# Patient Record
Sex: FEMALE | Race: WHITE | NOT HISPANIC OR LATINO | Employment: OTHER | URBAN - NONMETROPOLITAN AREA
[De-identification: names, ages, dates, MRNs, and addresses within clinical notes are randomized per-mention and may not be internally consistent; named-entity substitution may affect disease eponyms.]

---

## 2017-03-16 DIAGNOSIS — D05.10: Primary | ICD-10-CM

## 2017-03-17 ENCOUNTER — LAB (OUTPATIENT)
Dept: ONCOLOGY | Facility: HOSPITAL | Age: 57
End: 2017-03-17

## 2017-03-17 ENCOUNTER — OFFICE VISIT (OUTPATIENT)
Dept: ONCOLOGY | Facility: CLINIC | Age: 57
End: 2017-03-17

## 2017-03-17 VITALS
SYSTOLIC BLOOD PRESSURE: 165 MMHG | WEIGHT: 165.2 LBS | BODY MASS INDEX: 29.26 KG/M2 | TEMPERATURE: 97.4 F | DIASTOLIC BLOOD PRESSURE: 98 MMHG | RESPIRATION RATE: 18 BRPM | HEART RATE: 80 BPM

## 2017-03-17 DIAGNOSIS — D05.11: Primary | ICD-10-CM

## 2017-03-17 DIAGNOSIS — D05.10: ICD-10-CM

## 2017-03-17 LAB
BASOPHILS # BLD AUTO: 0.05 10*3/MM3 (ref 0–0.2)
BASOPHILS NFR BLD AUTO: 0.5 % (ref 0–2)
DEPRECATED RDW RBC AUTO: 46.7 FL (ref 36.4–46.3)
EOSINOPHIL # BLD AUTO: 0.16 10*3/MM3 (ref 0–0.7)
EOSINOPHIL NFR BLD AUTO: 1.7 % (ref 0–7)
ERYTHROCYTE [DISTWIDTH] IN BLOOD BY AUTOMATED COUNT: 14.6 % (ref 11.5–14.5)
HCT VFR BLD AUTO: 39.1 % (ref 35–45)
HGB BLD-MCNC: 13.4 G/DL (ref 12–15.5)
IMM GRANULOCYTES # BLD: 0.01 10*3/MM3 (ref 0–0.02)
IMM GRANULOCYTES NFR BLD: 0.1 % (ref 0–0.5)
LYMPHOCYTES # BLD AUTO: 3.39 10*3/MM3 (ref 0.6–4.2)
LYMPHOCYTES NFR BLD AUTO: 35.8 % (ref 10–50)
MCH RBC QN AUTO: 30 PG (ref 26.5–34)
MCHC RBC AUTO-ENTMCNC: 34.3 G/DL (ref 31.4–36)
MCV RBC AUTO: 87.5 FL (ref 80–98)
MONOCYTES # BLD AUTO: 1.01 10*3/MM3 (ref 0–0.9)
MONOCYTES NFR BLD AUTO: 10.7 % (ref 0–12)
NEUTROPHILS # BLD AUTO: 4.85 10*3/MM3 (ref 2–8.6)
NEUTROPHILS NFR BLD AUTO: 51.2 % (ref 37–80)
PLATELET # BLD AUTO: 380 10*3/MM3 (ref 150–450)
PMV BLD AUTO: 9.4 FL (ref 8–12)
RBC # BLD AUTO: 4.47 10*6/MM3 (ref 3.77–5.16)
WBC NRBC COR # BLD: 9.47 10*3/MM3 (ref 3.2–9.8)

## 2017-03-17 PROCEDURE — 85025 COMPLETE CBC W/AUTO DIFF WBC: CPT | Performed by: INTERNAL MEDICINE

## 2017-03-17 PROCEDURE — 36415 COLL VENOUS BLD VENIPUNCTURE: CPT | Performed by: INTERNAL MEDICINE

## 2017-03-17 PROCEDURE — 99213 OFFICE O/P EST LOW 20 MIN: CPT | Performed by: INTERNAL MEDICINE

## 2017-03-17 NOTE — PROGRESS NOTES
DATE OF VISIT: 3/17/2017    REASON FOR VISIT:  History of right breast DCIS    HISTORY OF PRESENT ILLNESS:    57-year-old female with a past medical history significant for chronic obstructive pulmonary disease, hypertension was diagnosed with a ductal carcinoma of right breast in October 2013.  Patient is status post lumpectomy and radiation therapy subsequently was started on tamoxifen March 2014.  Tamoxifen she could not tolerate more than a year so currently she is on surveillance.  Denies any new swollen glands anywhere in the body area.denies any fever or chills or night sweats.        PAST MEDICAL HISTORY:    Past Medical History   Diagnosis Date   • Allergic rhinitis    • Chronic obstructive lung disease      Relatively mild at this point   • Essential (primary) hypertension      Started Diovan, 3/2016      • GERD (gastroesophageal reflux disease)    • History of colon polyps    • Hyperlipidemia      On Pravachol    • Intraductal carcinoma of breast       In situ. Right. Lumpectomy 2013, Dr. Lion. Radiation, Dr. Velasco. On tamoxifen.   • Mild intermittent asthma    • Osteopenia      Last DEXA 7/2014. Started Caltrate, 7/2015    • Tobacco dependence syndrome        SOCIAL HISTORY:    Social History   Substance Use Topics   • Smoking status: Former Smoker   • Smokeless tobacco: None      Comment: Tobacco reviewed with patient 3/15/2016   • Alcohol use No       Surgical History :  Past Surgical History   Procedure Laterality Date   • Other surgical history       Biopsy of Cervix  -  abnormal Pap smear cervical biopsy was normal   • Breast surgery  10/08/2013     (2)  Excision of the previously excised medial margin.   9/16/2013 - Automated vacuum-assisted biopsy of the right breast. Sterotactic location guidance. Radiological examination of surgical specimen. Tissue marker placement   • Other surgical history  09/23/2013     Remove breast lesion (1). Ultrasound localization of the lesion followed by excision, clip  placement and x-ray examination of the surgical specimen   • Vaginal delivery       x 2       ALLERGIES:    Allergies   Allergen Reactions   • Augmentin [Amoxicillin-Pot Clavulanate]      N/V   • Ceftin [Cefuroxime]    • Ciprofloxacin        REVIEW OF SYSTEMS:      CONSTITUTIONAL:  No fever, chills, or night sweats.     HEENT:  No epistaxis, mouth sores, or difficulty swallowing.    RESPIRATORY:  No new shortness of breath or cough at present.    CARDIOVASCULAR:  No chest pain or palpitations.    GASTROINTESTINAL:  No abdominal pain, nausea, vomiting, or blood in the stool.    GENITOURINARY:  No dysuria or hematuria.    MUSCULOSKELETAL:  No any new back pain or arthralgias.     NEUROLOGICAL:  No tingling or numbness. No new headache or dizziness.     LYMPHATICS:  Denies any abnormal swollen and anywhere in the body.    SKIN:  Denies any new skin rash.    PHYSICAL EXAMINATION:      VITAL SIGNS:    Visit Vitals   • /98   • Pulse 80   • Temp 97.4 °F (36.3 °C)   • Resp 18   • Wt 165 lb 3.2 oz (74.9 kg)   • BMI 29.26 kg/m2       GENERAL:  Not in any distress.    HEENT:  Normocephalic, Atraumatic.Mild Conjunctival pallor. No icterus. Extraocular Movements Intact. No Fascial Asymmetry noted.    NECK:  No adenopathy. No JVD.    RESPIRATORY:  Fair air entry bilateral. No rhonchi or wheezing.    CARDIOVASCULAR:  S1, S2. Regular rate and rhythm. No murmur or gallop appreciated.    ABDOMEN:  Soft, obese, nontender. Bowel sounds present in all four quadrants.  No organomegaly appreciated.    EXTREMITIES:  No edema.No Calf Tenderness.    NEUROLOGIC:  Alert, awake and oriented ×3.  No  Motor or sensory deficit appreciated. Cranial Nerves 2-12 grossly intact.      DIAGNOSTIC DATA:    GLUCOSE   Date Value Ref Range Status   07/12/2016 97 70.0 - 100.0 mg/dl Final     SODIUM   Date Value Ref Range Status   07/12/2016 138.0 134 - 146 mmol/L Final     POTASSIUM   Date Value Ref Range Status   07/12/2016 4.4 3.4 - 5.4 mmol/L Final      CO2   Date Value Ref Range Status   07/12/2016 29.0 20.0 - 32.0 mmol/L Final     CHLORIDE   Date Value Ref Range Status   07/12/2016 102.0 100.0 - 112.0 mmol/L Final     ANION GAP   Date Value Ref Range Status   07/12/2016 7.0 5.0 - 15.0 mmol/L Final     CREATININE   Date Value Ref Range Status   07/12/2016 0.7 0.4 - 1.3 mg/dl Final     BUN   Date Value Ref Range Status   07/12/2016 8 8.0 - 25.0 mg/dl Final     CALCIUM   Date Value Ref Range Status   07/12/2016 9.4 8.4 - 10.8 mg/dl Final     ALKALINE PHOSPHATASE   Date Value Ref Range Status   07/12/2016 68 15 - 121 U/L Final     TOTAL PROTEIN   Date Value Ref Range Status   07/12/2016 7.6 6.7 - 8.2 gm/dl Final     ALT (SGPT)   Date Value Ref Range Status   07/12/2016 24 10 - 60 U/L Final     AST (SGOT)   Date Value Ref Range Status   07/12/2016 30 10 - 60 U/L Final     TOTAL BILIRUBIN   Date Value Ref Range Status   07/12/2016 0.4 0.2 - 1.0 mg/dl Final     ALBUMIN   Date Value Ref Range Status   07/12/2016 4.1 3.2 - 5.5 gm/dl Final     Lab Results   Component Value Date    WBC 9.47 03/17/2017    HGB 13.4 03/17/2017    HCT 39.1 03/17/2017    MCV 87.5 03/17/2017     03/17/2017     Lab Results   Component Value Date    NEUTROABS 4.85 03/17/2017          RADIOLOGY DATA :  Diagnostic mammogram of right breast done on September 22, 2016 shows:  CONCLUSION-   1. Probable benign dystrophic calcifications in the postoperative site  in the right breast. Recommend diagnostic right breast mammogram in  six months to ensure stability.      2. BI-RADS Category 3- Probably benign findings. Initial short  interval follow-up suggested.      ASSESSMENT AND PLAN:      1.  Right breast ductal carcinoma in situ diagnosed in October 2013.  Status post lumpectomy followed by adjuvant radiation therapy which was completed in January 2014.  She was started on tamoxifen in March 2014 which he took for a year and half, around October 2015 she started having severe arthralgias and  lower extremities and tamoxifen was discontinued.  She has been on surveillance since then.  Her last diagnostic mammogram of right breast done in September 2016 was BI-RADS Category 3 and she is due to get another screening mammogram of right breast at this point of diagnostic quality.  We will order it.  We will also order a screening mammogram for bilateral breast cyst in August 2017 and see her back here in September 2017.    2.  Hypertension    3.  Chronic obstructive pulmonary disease    4.  Health maintenance: Patient does not smoke currently.  Had a colonoscopy last 5 years.  She remains full code.         Bonilla Granados MD  3/17/2017  4:08 PM        EMR Dragon/Transcription disclaimer:   Much of this encounter note is an electronic transcription/translation of spoken language to printed text. The electronic translation of spoken language may permit erroneous, or at times, nonsensical words or phrases to be inadvertently transcribed; Although I have reviewed the note for such errors, some may still exist.

## 2017-03-27 ENCOUNTER — OFFICE VISIT (OUTPATIENT)
Dept: FAMILY MEDICINE CLINIC | Facility: CLINIC | Age: 57
End: 2017-03-27

## 2017-03-27 VITALS
TEMPERATURE: 98.7 F | SYSTOLIC BLOOD PRESSURE: 124 MMHG | HEART RATE: 76 BPM | WEIGHT: 163.4 LBS | BODY MASS INDEX: 28.95 KG/M2 | DIASTOLIC BLOOD PRESSURE: 68 MMHG | HEIGHT: 63 IN

## 2017-03-27 DIAGNOSIS — R00.2 PALPITATIONS: Primary | ICD-10-CM

## 2017-03-27 DIAGNOSIS — J43.9 PULMONARY EMPHYSEMA, UNSPECIFIED EMPHYSEMA TYPE (HCC): ICD-10-CM

## 2017-03-27 PROCEDURE — 99213 OFFICE O/P EST LOW 20 MIN: CPT | Performed by: INTERNAL MEDICINE

## 2017-03-27 PROCEDURE — 93000 ELECTROCARDIOGRAM COMPLETE: CPT | Performed by: INTERNAL MEDICINE

## 2017-04-06 ENCOUNTER — TELEPHONE (OUTPATIENT)
Dept: FAMILY MEDICINE CLINIC | Facility: CLINIC | Age: 57
End: 2017-04-06

## 2017-04-06 RX ORDER — METOPROLOL SUCCINATE 25 MG/1
25 TABLET, EXTENDED RELEASE ORAL DAILY
Qty: 30 TABLET | Refills: 5 | Status: SHIPPED | OUTPATIENT
Start: 2017-04-06 | End: 2017-07-20

## 2017-04-06 NOTE — TELEPHONE ENCOUNTER
----- Message from Jesus López MD sent at 4/6/2017 12:02 PM CDT -----  Notify the patient.  Holter monitor reveals occasional episodes of rapid, but harmless heart rate.  Start Toprol-XL 25 mg daily.  Return for further evaluation if this does not greatly help her sensation of palpitations.  EB      Relayed results to patient and meds called to pharmacy. BERNARD

## 2017-04-14 ENCOUNTER — TELEPHONE (OUTPATIENT)
Dept: FAMILY MEDICINE CLINIC | Facility: CLINIC | Age: 57
End: 2017-04-14

## 2017-04-14 NOTE — TELEPHONE ENCOUNTER
"----- Message from Jesus López MD sent at 4/14/2017 10:29 AM CDT -----  Have the patient take Toprol-XL 25 mg one half tablet twice daily over the weekend and notify us next week if this does not help with her symptoms.  If still having problems, we need to have her come back in for further evaluation.  ----- Message -----     From: Jayne Del Rio RN     Sent: 4/14/2017   9:24 AM       To: Jesus López MD    I called patient and she states around 9 pm for the last 2 nights since taking Toprol has had increase in palpitations along with SOB, headache and chest tightness. States last approximately 1 hour. TP  ----- Message -----     From: Kimberly Viveros     Sent: 4/14/2017   8:27 AM       To: Jayne Del Rio RN    Patient states, \"Having side effects from new medication\". Please call to advise      I called patient back and instructed regarding new directions. She states she is afraid of the meds and doesn't want to try it. I instructed if symptoms returned to f/u with a physician or ER.. TP  "

## 2017-04-14 NOTE — TELEPHONE ENCOUNTER
"----- Message from Kimberly Viveros sent at 4/14/2017  8:27 AM CDT -----  Patient states, \"Having side effects from new medication\". Please call to advise  "

## 2017-07-20 ENCOUNTER — OFFICE VISIT (OUTPATIENT)
Dept: OBSTETRICS AND GYNECOLOGY | Facility: CLINIC | Age: 57
End: 2017-07-20

## 2017-07-20 VITALS
DIASTOLIC BLOOD PRESSURE: 86 MMHG | WEIGHT: 162 LBS | BODY MASS INDEX: 28.7 KG/M2 | HEIGHT: 63 IN | SYSTOLIC BLOOD PRESSURE: 135 MMHG

## 2017-07-20 DIAGNOSIS — N81.11 MIDLINE CYSTOCELE: Primary | ICD-10-CM

## 2017-07-20 PROCEDURE — 99212 OFFICE O/P EST SF 10 MIN: CPT | Performed by: OBSTETRICS & GYNECOLOGY

## 2017-07-21 NOTE — PROGRESS NOTES
Subjective     Chief Complaint   Patient presents with   • Gynecologic Exam       Val Waldron is a 57 y.o.   presents today with complaints of pelvic pressure.  Patient states it started about 1 year ago, however, it started worsening about October, however, surprisingly it got better this summer since school got out.  She states when she stands on her feet for long periods of time, she feels a pressure in her vagina and a heaviness.  No bulge.  No discharge or bleeding.  No issues with urination or bowel movements, but she states sometimes she has to move around on the toilet to empty her bladder completely.  No crede maneuver.  No splinting for bowel movements    Patient states she has had 2 vaginal deliveries, no operative deliveries; had an episiotomy in first pregnancy and a large tear in 2nd pregnancy but states she doesn't think it was a 3rd or 4th degree.   Did have the diagnosis of COPD, but states since she stopped smoking no more chronic cough.     Past Medical History:   Diagnosis Date   • Allergic rhinitis    • Chronic obstructive lung disease     Relatively mild at this point   • Essential (primary) hypertension     Started Diovan, 3/2016      • GERD (gastroesophageal reflux disease)    • History of colon polyps    • Hyperlipidemia     On Pravachol    • Intraductal carcinoma of breast      In situ. Right. Lumpectomy , Dr. Lion. Radiation, Dr. Velasco. On tamoxifen.   • Mild intermittent asthma    • Osteopenia     Last DEXA 2014. Started Caltrate, 2015    • Tobacco dependence syndrome      Past Surgical History:   Procedure Laterality Date   • BREAST SURGERY  10/08/2013    (2)  Excision of the previously excised medial margin.   2013 - Automated vacuum-assisted biopsy of the right breast. Sterotactic location guidance. Radiological examination of surgical specimen. Tissue marker placement   • OTHER SURGICAL HISTORY      Biopsy of Cervix  -  abnormal Pap smear cervical biopsy was  "normal   • OTHER SURGICAL HISTORY  09/23/2013    Remove breast lesion (1). Ultrasound localization of the lesion followed by excision, clip placement and x-ray examination of the surgical specimen   • VAGINAL DELIVERY      x 2     Social History     Social History   • Marital status:      Spouse name: N/A   • Number of children: N/A   • Years of education: N/A     Occupational History   • Not on file.     Social History Main Topics   • Smoking status: Former Smoker   • Smokeless tobacco: Not on file      Comment: Tobacco reviewed with patient 3/15/2016   • Alcohol use No   • Drug use: No   • Sexual activity: Not on file     Other Topics Concern   • Not on file     Social History Narrative     Family History   Problem Relation Age of Onset   • Coronary artery disease Mother    • Prostate cancer Father    • Breast cancer Other      Aunt   • Endometrial cancer Neg Hx    • Ovarian cancer Neg Hx    • Colon cancer Neg Hx      Colorectal cancer     Objective      /86  Ht 63\" (160 cm)  Wt 162 lb (73.5 kg)  BMI 28.7 kg/m2    Physical Exam  General:   Appears stated age, AAOx3, NAD   Abdomen: Soft, nttp, no masses palpated   Pelvis: External genitalia - NEFG  Urethra - normal appearing, no urethra caruncle or prolapse  Vagina - normal appearing vagina, no discharge or bleeding noted, no lesions.  Stage 2-3 cystocele   Cervix - Normal appearing cervix  Uterus - 6 week sized uterus with stage 1 prolapse   Adenxa - no adnexal fullness or masses noted  Anus - normal appearing   Neurologic: CN II - XII grossly intact       Assessment/Plan     ASSESSMENT  1. Midline cystocele        PLAN  1. Midline cystocele   - Discussed prolapse and quality of life issues  - Discussed options for treatment including expectant vs medical with pessary vs surgery with anterior repair  - Patient is undecided, pamphlets given on prolapse and treatment  - Patient will contact office once she decides what she wants to do.     Lissy GARCIA" Friday, MD  7/21/2017

## 2020-10-01 ENCOUNTER — OFFICE VISIT (OUTPATIENT)
Dept: SURGERY | Facility: CLINIC | Age: 60
End: 2020-10-01

## 2020-10-01 ENCOUNTER — HOSPITAL ENCOUNTER (OUTPATIENT)
Dept: ULTRASOUND IMAGING | Facility: HOSPITAL | Age: 60
Discharge: HOME | End: 2020-10-01
Admitting: SURGERY

## 2020-10-01 VITALS
DIASTOLIC BLOOD PRESSURE: 86 MMHG | HEART RATE: 91 BPM | BODY MASS INDEX: 30.49 KG/M2 | SYSTOLIC BLOOD PRESSURE: 130 MMHG | TEMPERATURE: 97.1 F | HEIGHT: 64 IN | WEIGHT: 178.6 LBS

## 2020-10-01 DIAGNOSIS — R92.8 ABNORMAL MAMMOGRAM: ICD-10-CM

## 2020-10-01 DIAGNOSIS — R92.8 ABNORMAL MAMMOGRAM: Primary | ICD-10-CM

## 2020-10-01 DIAGNOSIS — D05.11 DUCTAL CARCINOMA IN SITU (DCIS) OF RIGHT BREAST: ICD-10-CM

## 2020-10-01 PROCEDURE — 76642 ULTRASOUND BREAST LIMITED: CPT

## 2020-10-01 PROCEDURE — 99203 OFFICE O/P NEW LOW 30 MIN: CPT | Performed by: SURGERY

## 2020-10-01 RX ORDER — OXYCODONE HYDROCHLORIDE AND ACETAMINOPHEN 5; 325 MG/1; MG/1
1 TABLET ORAL ONCE
Status: CANCELLED | OUTPATIENT
Start: 2020-10-01 | End: 2020-10-01

## 2020-10-01 RX ORDER — ROSUVASTATIN CALCIUM 10 MG/1
10 TABLET, COATED ORAL DAILY
COMMUNITY
Start: 2020-09-22 | End: 2021-04-07 | Stop reason: ALTCHOICE

## 2020-10-01 RX ORDER — LIDOCAINE HYDROCHLORIDE AND EPINEPHRINE 10; 10 MG/ML; UG/ML
30 INJECTION, SOLUTION INFILTRATION; PERINEURAL ONCE
Status: CANCELLED | OUTPATIENT
Start: 2020-10-01 | End: 2020-10-01

## 2020-10-01 RX ORDER — FLUTICASONE FUROATE 100 UG/1
1 POWDER RESPIRATORY (INHALATION) DAILY
COMMUNITY
Start: 2020-09-22 | End: 2021-11-16 | Stop reason: SDUPTHER

## 2020-10-01 RX ORDER — SIMETHICONE 125 MG
125 TABLET,CHEWABLE ORAL AS NEEDED
COMMUNITY

## 2020-10-01 RX ORDER — DIAZEPAM 5 MG/1
5 TABLET ORAL ONCE
Status: CANCELLED | OUTPATIENT
Start: 2020-10-01 | End: 2020-10-01

## 2020-10-01 RX ORDER — MONTELUKAST SODIUM 10 MG/1
10 TABLET ORAL DAILY
COMMUNITY
Start: 2020-09-22 | End: 2023-01-09 | Stop reason: SDUPTHER

## 2020-10-01 NOTE — H&P (VIEW-ONLY)
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  She presents today with an abnormal left mammogram and ultrasound suggesting a complex cystic lesion at the 3 o'clock position 4 cm from the nipple.  This is been read as BI-RADS Category 4.  She has had no newly palpable masses, skin dimpling, nipple discharge, or axillary adenopathy.  Imaging demonstrates the following:       Suspicious abnormality -- Biopsy should be considered (BI-RADS 4).    Recommendation:  Ultrasound-guided left breast biopsy. That can be performed at our facilities in [place] on an outpatient basis, if desired.    [id number]   Result Narrative   ULTRASOUND  LEFT  BREAST LIMITED    Indication:  Abnormal mammogram. Callback from screening.    Comparison:  Recent screening mammography 9/22/2020.    Findings:  There is small circumscribed mass in the slight lateral and inferior left breast anterior third seen on mammography that was further evaluated with ultrasound. At 3:00 about 1 cm from nipple is the correlate. This measures 5 x 3 mm with long   axis parallel to the skin. It is a complex cystic and solid mass with no definite internal vascularity on Doppler. At least half of the lesion appears to be solid. It does have circumscribed borders and no posterior shadowing. The patient has a history   of prior malignancy in the right breast in 2013. Recommend ultrasound-guided biopsy of this lesion since it is not definitively benign.    I spoke to the patient about findings and gave her my recommendations. She is comfortable with that plan. She is following up with her referring for ordering and scheduling.     Unfortunately, I was unable to open these x-rays and an ultrasound obtained today here demonstrates the lesion in question.  I would read  this is BI-RADS Category 4.  Past Medical History:   Diagnosis Date   • Allergic rhinitis    • Breast cancer (CMS/HCC)    • Chronic obstructive lung disease (CMS/HCC)     Relatively mild at this point   • Essential (primary) hypertension     Started Diovan, 3/2016      • GERD (gastroesophageal reflux disease)    • History of colon polyps    • Hx of radiation therapy    • Hyperlipidemia     On Pravachol    • Intraductal carcinoma of breast      In situ. Right. Lumpectomy 2013, Dr. Lion. Radiation, Dr. Velasco. On tamoxifen.   • Mild intermittent asthma    • Osteopenia     Last DEXA 7/2014. Started Caltrate, 7/2015    • Tobacco dependence syndrome        Past Surgical History:   Procedure Laterality Date   • BREAST BIOPSY     • BREAST LUMPECTOMY     • BREAST SURGERY  10/08/2013    (2)  Excision of the previously excised medial margin.   9/16/2013 - Automated vacuum-assisted biopsy of the right breast. Sterotactic location guidance. Radiological examination of surgical specimen. Tissue marker placement   • OTHER SURGICAL HISTORY      Biopsy of Cervix  -  abnormal Pap smear cervical biopsy was normal   • OTHER SURGICAL HISTORY  09/23/2013    Remove breast lesion (1). Ultrasound localization of the lesion followed by excision, clip placement and x-ray examination of the surgical specimen   • VAGINAL DELIVERY      x 2         Current Outpatient Medications:   •  fexofenadine (ALLEGRA) 180 MG tablet, Take 180 mg by mouth as needed., Disp: , Rfl:   •  Fluticasone Furoate (Arnuity Ellipta) 100 MCG/ACT aerosol powder , Inhale 1 puff Daily., Disp: , Rfl:   •  lansoprazole (PREVACID) 30 MG capsule, Take 30 mg by mouth every morning. (unconfirmed), Disp: , Rfl:   •  montelukast (SINGULAIR) 10 MG tablet, Take 10 mg by mouth Daily., Disp: , Rfl:   •  pravastatin (PRAVACHOL) 20 MG tablet, Take  by mouth. Take one tablet every night at bedtime for cholesterol.  (unconfirmed), Disp: , Rfl:   •  VITAMIN D PO, Take 50,000 Units by mouth.,  Disp: , Rfl:   •  albuterol (PROAIR HFA) 108 (90 BASE) MCG/ACT inhaler, Inhale 2 puffs 4 (four) times a day as needed for wheezing. (unconfirmed), Disp: , Rfl:   •  pseudoephedrine-guaifenesin (MUCINEX D)  MG per 12 hr tablet, Take  by mouth. Take one tablet every morning and take one tablet with supper as needed for congestion/allergies.  (unconfirmed), Disp: , Rfl:   •  rosuvastatin (CRESTOR) 10 MG tablet, Take 10 mg by mouth Daily., Disp: , Rfl:   •  simethicone (MYLICON) 125 MG chewable tablet, Chew 125 mg As Needed., Disp: , Rfl:     Allergies   Allergen Reactions   • Augmentin [Amoxicillin-Pot Clavulanate]      N/V   • Ceftin [Cefuroxime]    • Ciprofloxacin        Family History   Problem Relation Age of Onset   • Coronary artery disease Mother    • Prostate cancer Father    • Breast cancer Other         Aunt   • Breast cancer Paternal Aunt    • Endometrial cancer Neg Hx    • Ovarian cancer Neg Hx    • Colon cancer Neg Hx         Colorectal cancer       Social History     Socioeconomic History   • Marital status:      Spouse name: Not on file   • Number of children: Not on file   • Years of education: Not on file   • Highest education level: Not on file   Tobacco Use   • Smoking status: Former Smoker     Types: Cigarettes   • Smokeless tobacco: Never Used   • Tobacco comment: Tobacco reviewed with patient 3/15/2016   Substance and Sexual Activity   • Alcohol use: No   • Drug use: No       Review of Systems   Constitutional: Negative.    HENT: Negative.    Eyes: Negative.    Respiratory: Negative.    Cardiovascular: Negative.    Gastrointestinal:        Hemorrhoids   Endocrine: Negative.    Genitourinary: Negative.    Musculoskeletal: Negative.    Skin: Positive for rash.   Allergic/Immunologic: Positive for environmental allergies.   Neurological: Negative.    Psychiatric/Behavioral: Negative.        Physical Exam  Vitals signs and nursing note reviewed. Exam conducted with a chaperone present.      Constitutional:       Appearance: Normal appearance.   HENT:      Head: Normocephalic and atraumatic.   Neck:      Musculoskeletal: Normal range of motion and neck supple.   Cardiovascular:      Rate and Rhythm: Normal rate and regular rhythm.   Pulmonary:      Effort: Pulmonary effort is normal. No respiratory distress.   Chest:      Breasts: Breasts are symmetrical.         Right: No inverted nipple, mass, nipple discharge, skin change or tenderness.         Left: No inverted nipple, mass, nipple discharge, skin change or tenderness.   Musculoskeletal: Normal range of motion.         General: No swelling.   Skin:     General: Skin is warm and dry.      Coloration: Skin is not jaundiced or pale.      Findings: No bruising, erythema or rash.   Neurological:      General: No focal deficit present.      Mental Status: She is alert and oriented to person, place, and time.   Psychiatric:         Mood and Affect: Mood normal.         Thought Content: Thought content normal.         Judgment: Judgment normal.           ASSESSMENT    Val was seen today for abnormal breast imaging.    Diagnoses and all orders for this visit:    Abnormal mammogram  -     US Breast Left Limited; Future  -     US Guided Breast Biopsy With & Without Device initial Left    History of ductal carcinoma in situ (DCIS) of right breast treated in 2013 with lumpectomy and radiation therapy    PLAN    1.  Ultrasound directed left mammotome biopsy clip placement are planned    The following were discussed with the patient/family:      What are the indications that have led your doctor to the opinion that an operation is necessary?    Breast imaging has determined an abnormal area requiring biopsy.    What, if any, alternative treatments are available for your condition?    Alternatively, open biopsy in the operating room may be performed under sedation or general anesthesia. Observation is not a good option.    What will be the likely result if you  don't have the operation?    There is a possibility of missing a breast cancer diagnosis.    What are the basic procedures involved in the operation?    The patient will be placed supine for the procedure. A small incision will be made under local anesthesia, and a special, large needle will be used to perform the biopsy.   A permanent titanium clip will be placed in the breast to harjinder the site of biopsy should further surgery be necessary.    What are the risks?    The risks of bleeding, infection, the possible need for open biopsy or other procedure, scarring, and poor wound healing are explained. Bruising almost always occurs. There is a low transfusion risk. The risks of chronic pain are, likewise, low.     How is the operation expected to improve your health or quality of life?    The lesion can usually be determined to be benign or malignant. Follow up xrays or further open excision may be necessary depending on the pathology.    Is hospitalization necessary and, if so, how long can you expect to be hospitalized?    This procedure is performed on an outpatient basis.    What can you expect during your recovery period?    Minimal pain is usually controlled with non-narcotic analgesia.    When can you expect to resume normal activities?    Normal activity may be resumed the following day.    Are there likely to be residual effects from the operation?    Usually, there are no residual effects following biiopsy.    .   All questions were answered. The patient agrees to operation.              This document has been electronically signed by David Lion MD on October 1, 2020 19:25 CDT

## 2020-10-01 NOTE — PROGRESS NOTES
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  She presents today with an abnormal left mammogram and ultrasound suggesting a complex cystic lesion at the 3 o'clock position 4 cm from the nipple.  This is been read as BI-RADS Category 4.  She has had no newly palpable masses, skin dimpling, nipple discharge, or axillary adenopathy.  Imaging demonstrates the following:       Suspicious abnormality -- Biopsy should be considered (BI-RADS 4).    Recommendation:  Ultrasound-guided left breast biopsy. That can be performed at our facilities in [place] on an outpatient basis, if desired.    [id number]   Result Narrative   ULTRASOUND  LEFT  BREAST LIMITED    Indication:  Abnormal mammogram. Callback from screening.    Comparison:  Recent screening mammography 9/22/2020.    Findings:  There is small circumscribed mass in the slight lateral and inferior left breast anterior third seen on mammography that was further evaluated with ultrasound. At 3:00 about 1 cm from nipple is the correlate. This measures 5 x 3 mm with long   axis parallel to the skin. It is a complex cystic and solid mass with no definite internal vascularity on Doppler. At least half of the lesion appears to be solid. It does have circumscribed borders and no posterior shadowing. The patient has a history   of prior malignancy in the right breast in 2013. Recommend ultrasound-guided biopsy of this lesion since it is not definitively benign.    I spoke to the patient about findings and gave her my recommendations. She is comfortable with that plan. She is following up with her referring for ordering and scheduling.     Unfortunately, I was unable to open these x-rays and an ultrasound obtained today here demonstrates the lesion in question.  I would read  this is BI-RADS Category 4.  Past Medical History:   Diagnosis Date   • Allergic rhinitis    • Breast cancer (CMS/HCC)    • Chronic obstructive lung disease (CMS/HCC)     Relatively mild at this point   • Essential (primary) hypertension     Started Diovan, 3/2016      • GERD (gastroesophageal reflux disease)    • History of colon polyps    • Hx of radiation therapy    • Hyperlipidemia     On Pravachol    • Intraductal carcinoma of breast      In situ. Right. Lumpectomy 2013, Dr. Lion. Radiation, Dr. Velasco. On tamoxifen.   • Mild intermittent asthma    • Osteopenia     Last DEXA 7/2014. Started Caltrate, 7/2015    • Tobacco dependence syndrome        Past Surgical History:   Procedure Laterality Date   • BREAST BIOPSY     • BREAST LUMPECTOMY     • BREAST SURGERY  10/08/2013    (2)  Excision of the previously excised medial margin.   9/16/2013 - Automated vacuum-assisted biopsy of the right breast. Sterotactic location guidance. Radiological examination of surgical specimen. Tissue marker placement   • OTHER SURGICAL HISTORY      Biopsy of Cervix  -  abnormal Pap smear cervical biopsy was normal   • OTHER SURGICAL HISTORY  09/23/2013    Remove breast lesion (1). Ultrasound localization of the lesion followed by excision, clip placement and x-ray examination of the surgical specimen   • VAGINAL DELIVERY      x 2         Current Outpatient Medications:   •  fexofenadine (ALLEGRA) 180 MG tablet, Take 180 mg by mouth as needed., Disp: , Rfl:   •  Fluticasone Furoate (Arnuity Ellipta) 100 MCG/ACT aerosol powder , Inhale 1 puff Daily., Disp: , Rfl:   •  lansoprazole (PREVACID) 30 MG capsule, Take 30 mg by mouth every morning. (unconfirmed), Disp: , Rfl:   •  montelukast (SINGULAIR) 10 MG tablet, Take 10 mg by mouth Daily., Disp: , Rfl:   •  pravastatin (PRAVACHOL) 20 MG tablet, Take  by mouth. Take one tablet every night at bedtime for cholesterol.  (unconfirmed), Disp: , Rfl:   •  VITAMIN D PO, Take 50,000 Units by mouth.,  Disp: , Rfl:   •  albuterol (PROAIR HFA) 108 (90 BASE) MCG/ACT inhaler, Inhale 2 puffs 4 (four) times a day as needed for wheezing. (unconfirmed), Disp: , Rfl:   •  pseudoephedrine-guaifenesin (MUCINEX D)  MG per 12 hr tablet, Take  by mouth. Take one tablet every morning and take one tablet with supper as needed for congestion/allergies.  (unconfirmed), Disp: , Rfl:   •  rosuvastatin (CRESTOR) 10 MG tablet, Take 10 mg by mouth Daily., Disp: , Rfl:   •  simethicone (MYLICON) 125 MG chewable tablet, Chew 125 mg As Needed., Disp: , Rfl:     Allergies   Allergen Reactions   • Augmentin [Amoxicillin-Pot Clavulanate]      N/V   • Ceftin [Cefuroxime]    • Ciprofloxacin        Family History   Problem Relation Age of Onset   • Coronary artery disease Mother    • Prostate cancer Father    • Breast cancer Other         Aunt   • Breast cancer Paternal Aunt    • Endometrial cancer Neg Hx    • Ovarian cancer Neg Hx    • Colon cancer Neg Hx         Colorectal cancer       Social History     Socioeconomic History   • Marital status:      Spouse name: Not on file   • Number of children: Not on file   • Years of education: Not on file   • Highest education level: Not on file   Tobacco Use   • Smoking status: Former Smoker     Types: Cigarettes   • Smokeless tobacco: Never Used   • Tobacco comment: Tobacco reviewed with patient 3/15/2016   Substance and Sexual Activity   • Alcohol use: No   • Drug use: No       Review of Systems   Constitutional: Negative.    HENT: Negative.    Eyes: Negative.    Respiratory: Negative.    Cardiovascular: Negative.    Gastrointestinal:        Hemorrhoids   Endocrine: Negative.    Genitourinary: Negative.    Musculoskeletal: Negative.    Skin: Positive for rash.   Allergic/Immunologic: Positive for environmental allergies.   Neurological: Negative.    Psychiatric/Behavioral: Negative.        Physical Exam  Vitals signs and nursing note reviewed. Exam conducted with a chaperone present.    Constitutional:       Appearance: Normal appearance.   HENT:      Head: Normocephalic and atraumatic.   Neck:      Musculoskeletal: Normal range of motion and neck supple.   Cardiovascular:      Rate and Rhythm: Normal rate and regular rhythm.   Pulmonary:      Effort: Pulmonary effort is normal. No respiratory distress.   Chest:      Breasts: Breasts are symmetrical.         Right: No inverted nipple, mass, nipple discharge, skin change or tenderness.         Left: No inverted nipple, mass, nipple discharge, skin change or tenderness.   Musculoskeletal: Normal range of motion.         General: No swelling.   Skin:     General: Skin is warm and dry.      Coloration: Skin is not jaundiced or pale.      Findings: No bruising, erythema or rash.   Neurological:      General: No focal deficit present.      Mental Status: She is alert and oriented to person, place, and time.   Psychiatric:         Mood and Affect: Mood normal.         Thought Content: Thought content normal.         Judgment: Judgment normal.           ASSESSMENT    Val was seen today for abnormal breast imaging.    Diagnoses and all orders for this visit:    Abnormal mammogram  -     US Breast Left Limited; Future  -     US Guided Breast Biopsy With & Without Device initial Left    History of ductal carcinoma in situ (DCIS) of right breast treated in 2013 with lumpectomy and radiation therapy    PLAN    1.  Ultrasound directed left mammotome biopsy clip placement are planned    The following were discussed with the patient/family:      What are the indications that have led your doctor to the opinion that an operation is necessary?    Breast imaging has determined an abnormal area requiring biopsy.    What, if any, alternative treatments are available for your condition?    Alternatively, open biopsy in the operating room may be performed under sedation or general anesthesia. Observation is not a good option.    What will be the likely result if you  don't have the operation?    There is a possibility of missing a breast cancer diagnosis.    What are the basic procedures involved in the operation?    The patient will be placed supine for the procedure. A small incision will be made under local anesthesia, and a special, large needle will be used to perform the biopsy.   A permanent titanium clip will be placed in the breast to harjinder the site of biopsy should further surgery be necessary.    What are the risks?    The risks of bleeding, infection, the possible need for open biopsy or other procedure, scarring, and poor wound healing are explained. Bruising almost always occurs. There is a low transfusion risk. The risks of chronic pain are, likewise, low.     How is the operation expected to improve your health or quality of life?    The lesion can usually be determined to be benign or malignant. Follow up xrays or further open excision may be necessary depending on the pathology.    Is hospitalization necessary and, if so, how long can you expect to be hospitalized?    This procedure is performed on an outpatient basis.    What can you expect during your recovery period?    Minimal pain is usually controlled with non-narcotic analgesia.    When can you expect to resume normal activities?    Normal activity may be resumed the following day.    Are there likely to be residual effects from the operation?    Usually, there are no residual effects following biiopsy.    .   All questions were answered. The patient agrees to operation.              This document has been electronically signed by David Lion MD on October 1, 2020 19:25 CDT

## 2020-10-01 NOTE — PATIENT INSTRUCTIONS

## 2020-10-02 ENCOUNTER — TRANSCRIBE ORDERS (OUTPATIENT)
Dept: GENERAL RADIOLOGY | Facility: HOSPITAL | Age: 60
End: 2020-10-02

## 2020-10-02 ENCOUNTER — LAB (OUTPATIENT)
Dept: LAB | Facility: HOSPITAL | Age: 60
End: 2020-10-02

## 2020-10-02 DIAGNOSIS — Z01.818 PREOP TESTING: ICD-10-CM

## 2020-10-02 DIAGNOSIS — Z01.818 PREOP TESTING: Primary | ICD-10-CM

## 2020-10-02 PROCEDURE — U0003 INFECTIOUS AGENT DETECTION BY NUCLEIC ACID (DNA OR RNA); SEVERE ACUTE RESPIRATORY SYNDROME CORONAVIRUS 2 (SARS-COV-2) (CORONAVIRUS DISEASE [COVID-19]), AMPLIFIED PROBE TECHNIQUE, MAKING USE OF HIGH THROUGHPUT TECHNOLOGIES AS DESCRIBED BY CMS-2020-01-R: HCPCS

## 2020-10-02 PROCEDURE — C9803 HOPD COVID-19 SPEC COLLECT: HCPCS

## 2020-10-03 LAB
COVID LABCORP PRIORITY: NORMAL
SARS-COV-2 RNA RESP QL NAA+PROBE: NOT DETECTED

## 2020-10-05 ENCOUNTER — HOSPITAL ENCOUNTER (OUTPATIENT)
Dept: ULTRASOUND IMAGING | Facility: HOSPITAL | Age: 60
Discharge: HOME | End: 2020-10-05
Admitting: SURGERY

## 2020-10-05 VITALS
WEIGHT: 175.93 LBS | HEART RATE: 71 BPM | BODY MASS INDEX: 30.04 KG/M2 | TEMPERATURE: 97.1 F | OXYGEN SATURATION: 96 % | RESPIRATION RATE: 18 BRPM | DIASTOLIC BLOOD PRESSURE: 90 MMHG | SYSTOLIC BLOOD PRESSURE: 184 MMHG | HEIGHT: 64 IN

## 2020-10-05 DIAGNOSIS — R92.8 ABNORMAL MAMMOGRAM: ICD-10-CM

## 2020-10-05 PROCEDURE — A4648 IMPLANTABLE TISSUE MARKER: HCPCS

## 2020-10-05 PROCEDURE — 19083 BX BREAST 1ST LESION US IMAG: CPT | Performed by: SURGERY

## 2020-10-05 RX ORDER — LIDOCAINE HYDROCHLORIDE AND EPINEPHRINE 10; 10 MG/ML; UG/ML
30 INJECTION, SOLUTION INFILTRATION; PERINEURAL ONCE
Status: COMPLETED | OUTPATIENT
Start: 2020-10-05 | End: 2020-10-05

## 2020-10-05 RX ORDER — OXYCODONE HYDROCHLORIDE AND ACETAMINOPHEN 5; 325 MG/1; MG/1
1 TABLET ORAL ONCE
Status: COMPLETED | OUTPATIENT
Start: 2020-10-05 | End: 2020-10-05

## 2020-10-05 RX ORDER — DIAZEPAM 5 MG/1
5 TABLET ORAL ONCE
Status: COMPLETED | OUTPATIENT
Start: 2020-10-05 | End: 2020-10-05

## 2020-10-05 RX ADMIN — DIAZEPAM 5 MG: 5 TABLET ORAL at 06:41

## 2020-10-05 RX ADMIN — OXYCODONE HYDROCHLORIDE AND ACETAMINOPHEN 1 TABLET: 5; 325 TABLET ORAL at 06:42

## 2020-10-05 RX ADMIN — LIDOCAINE HYDROCHLORIDE,EPINEPHRINE BITARTRATE 15 ML: 10; .01 INJECTION, SOLUTION INFILTRATION; PERINEURAL at 08:36

## 2020-10-05 NOTE — INTERVAL H&P NOTE
H&P reviewed. The patient was examined and there are no changes to the H&P.      Temp:  [97.2 °F (36.2 °C)] 97.2 °F (36.2 °C)  Heart Rate:  [76] 76  Resp:  [18] 18  BP: (179)/(83) 179/83

## 2020-10-07 LAB
LAB AP CASE REPORT: NORMAL
PATH REPORT.FINAL DX SPEC: NORMAL

## 2020-10-09 ENCOUNTER — TELEPHONE (OUTPATIENT)
Dept: SURGERY | Facility: CLINIC | Age: 60
End: 2020-10-09

## 2020-10-12 DIAGNOSIS — R92.8 ABNORMAL MAMMOGRAM: Primary | ICD-10-CM

## 2021-02-09 ENCOUNTER — OFFICE VISIT (OUTPATIENT)
Dept: FAMILY MEDICINE CLINIC | Facility: CLINIC | Age: 61
End: 2021-02-09

## 2021-02-09 VITALS
TEMPERATURE: 97 F | WEIGHT: 175 LBS | HEART RATE: 84 BPM | SYSTOLIC BLOOD PRESSURE: 160 MMHG | DIASTOLIC BLOOD PRESSURE: 80 MMHG | HEIGHT: 64 IN | BODY MASS INDEX: 29.88 KG/M2

## 2021-02-09 DIAGNOSIS — J01.00 ACUTE NON-RECURRENT MAXILLARY SINUSITIS: Primary | ICD-10-CM

## 2021-02-09 DIAGNOSIS — E53.8 VITAMIN B12 DEFICIENCY: ICD-10-CM

## 2021-02-09 DIAGNOSIS — E78.2 MIXED HYPERLIPIDEMIA: Chronic | ICD-10-CM

## 2021-02-09 DIAGNOSIS — I10 ESSENTIAL (PRIMARY) HYPERTENSION: Chronic | ICD-10-CM

## 2021-02-09 DIAGNOSIS — J30.1 SEASONAL ALLERGIC RHINITIS DUE TO POLLEN: Chronic | ICD-10-CM

## 2021-02-09 DIAGNOSIS — E66.3 OVERWEIGHT (BMI 25.0-29.9): ICD-10-CM

## 2021-02-09 DIAGNOSIS — D05.11 DUCTAL CARCINOMA IN SITU (DCIS) OF RIGHT BREAST: Chronic | ICD-10-CM

## 2021-02-09 DIAGNOSIS — M85.852 OSTEOPENIA OF LEFT HIP: Chronic | ICD-10-CM

## 2021-02-09 DIAGNOSIS — R92.8 ABNORMAL MAMMOGRAM OF LEFT BREAST: ICD-10-CM

## 2021-02-09 PROCEDURE — 99204 OFFICE O/P NEW MOD 45 MIN: CPT | Performed by: INTERNAL MEDICINE

## 2021-02-09 RX ORDER — DOXYCYCLINE HYCLATE 100 MG/1
100 CAPSULE ORAL 2 TIMES DAILY
Qty: 20 CAPSULE | Refills: 0 | Status: SHIPPED | OUTPATIENT
Start: 2021-02-09 | End: 2021-02-19

## 2021-02-09 NOTE — PATIENT INSTRUCTIONS

## 2021-02-09 NOTE — PROGRESS NOTES
Chief Complaint  URI (sinus congestion x 2 weeks, pressure in sinus and facial pain. She states had this 1 year ago), Headache (she has been seeing Anna Hunt but wants to change everything back to [facility]. States she is having breast biopsy and colonoscopy from [facility] providers and wants to continue. ), Nausea (usually occurs with sinus problem), and Establish Care (Reestablish as new patient.  Not seen since 2017.  Been seeing Anna Hunt)    Subjective          History of Present Illness     Val shearer presents to [facility] reporting 2-week history of sinus pressure and congestion worsening significantly last Friday, when she noticed blood-tinged nasal discharge.  She has had occasional cough productive of yellow sputum.  Denies fever or chills.  Patient reports CT of the sinuses a couple of years ago was unremarkable.  Dr. Hartley started her on Singulair and Astelin nasal spray.  However, she has switched to Flonase the past few days and has been using Simply Saline 2-3 times daily. She had allergy testing by Dr. Hartley revealing allergen reactive allergies.  She has been walking outdoors for exercise and feels the cold temperatures has flared the symptoms.  A similar episode one year ago was treated with doxycycline and Flonase with good results. Her breathing is significantly better since she stopped smoking.             Patient is status post lumpectomy right breast and radiation therapy subsequently was started on tamoxifen March 2014.  She had abnormal mammogram with subsequent breast biopsy 10/2020 revealing benign fibrocystic changes.  She is scheduled for repeat mammogram and diagnostic ultrasound next month.     She has colonoscopy scheduled with Dr. Darby for March 29th.      Her blood pressure is above goal today.  She feels that it has been much better at home.        Objective   Vital Signs:   /80   Pulse 84   Temp 97 °F  "(36.1 °C) (Infrared)   Ht 161.3 cm (63.5\")   Wt 79.4 kg (175 lb)   BMI 30.51 kg/m²       Physical Exam  Constitutional:       General: She is not in acute distress.     Appearance: She is well-developed.   HENT:      Head: Normocephalic and atraumatic.      Comments: Nasal congestion and maxillary sinus tenderness with postnasal drainage.        Nose: Nose normal.      Mouth/Throat:      Pharynx: No oropharyngeal exudate.   Eyes:      Pupils: Pupils are equal, round, and reactive to light.   Neck:      Musculoskeletal: Neck supple.      Thyroid: No thyromegaly.      Vascular: No JVD.   Cardiovascular:      Rate and Rhythm: Normal rate and regular rhythm.      Heart sounds: Normal heart sounds.   Pulmonary:      Effort: Pulmonary effort is normal. No accessory muscle usage or respiratory distress.      Breath sounds: Normal breath sounds. No wheezing or rales.   Abdominal:      General: Bowel sounds are normal. There is no distension.      Palpations: Abdomen is soft.      Tenderness: There is no abdominal tenderness.   Lymphadenopathy:      Cervical: No cervical adenopathy.   Neurological:      Mental Status: She is alert and oriented to person, place, and time.      Cranial Nerves: No cranial nerve deficit.   Psychiatric:         Speech: Speech normal.         Behavior: Behavior normal.         Thought Content: Thought content normal.         Judgment: Judgment normal.            Result Review :             Lipid Panel    Lipid Panel 2/17/20   Total Cholesterol 220 (A)   Triglycerides 103   HDL Cholesterol 68   LDL Cholesterol  120 (A)   (A) Abnormal value                Data reviewed: Consultant notes General surgery and mammogram           Future Appointments   Date Time Provider Department Center   3/2/2021  1:00 PM [facility] MAMM 1 [facility]   3/2/2021  1:30 PM MAD DIAG US 1 [facility]   3/8/2021  9:15 AM David Lion MD [facility] None   3/26/2021  9:15 AM C19 PRE SCREEN " [facility] C19PS [facility]   6/16/2021  2:00 PM Jesus López MD [facility]     Assessment and Plan    Diagnoses and all orders for this visit:    1. Acute non-recurrent maxillary sinusitis (Primary)  -     CBC Auto Differential; Future    2. Seasonal allergic rhinitis due to pollen    3. Ductal carcinoma in situ (DCIS) of right breast    4. Abnormal mammogram of left breast    5. Mixed hyperlipidemia  -     CBC Auto Differential; Future  -     Comprehensive Metabolic Panel; Future  -     Lipid Panel; Future  -     TSH; Future    6. Essential (primary) hypertension  -     CBC Auto Differential; Future  -     Comprehensive Metabolic Panel; Future    7. Vitamin B12 deficiency  -     Vitamin B12; Future    8. Osteopenia of left hip  -     Vitamin D 25 Hydroxy; Future    9. Overweight (BMI 25.0-29.9)    Other orders  -     doxycycline (VIBRAMYCIN) 100 MG capsule; Take 1 capsule by mouth 2 (Two) Times a Day for 10 days.  Dispense: 20 capsule; Refill: 0      I spent 52 minutes caring for Val on this date of service. This time includes time spent by me in the following activities:preparing for the visit, reviewing tests, performing a medically appropriate examination and/or evaluation , counseling and educating the patient/family/caregiver, ordering medications, tests, or procedures and documenting information in the medical record     For acute maxillary sinusitis, I sent prescriptions for doxycycline 100 mg b.i.d. x 10 days in this patient with several antibiotic intolerances/allergies.  Continue the Flonase nasal spray one spray each nostril b.i.d., Singulair, Allegra and p.r.n. Astelin nasal spray  She can add the Mucinex she purchased at [facility]. Recommended running a humidifier in the bedroom during the winter heating months.       Blood pressure was well above goal today.  Monitor blood pressure at home for the next 1 to 2 weeks and notify us if systolic is not consistently less than 140.    Continue the Crestor.   We will repeat lipid profile in 4 months.    I noticed that her vitamin B12 level has been low normal when checked by Anna Hunt.  We will recheck that with next set of labs.    Continue the calcium and vitamin D supplement in this patient with osteopenia.  She is due for repeat DEXA, which we will try to arrange this summer.    We will schedule patient for follow up on chronic medical issues in four months with fasting labs prior.      Scribed for Dr. López by [facility].     Follow Up   Return in about 4 months (around 6/9/2021) for Next scheduled follow up - labs 1 week prior.  Patient was given instructions and counseling regarding her condition or for health maintenance advice. Please see specific information pulled into the AVS if appropriate.

## 2021-02-12 ENCOUNTER — TELEPHONE (OUTPATIENT)
Dept: FAMILY MEDICINE CLINIC | Facility: CLINIC | Age: 61
End: 2021-02-12

## 2021-02-12 RX ORDER — VALSARTAN 80 MG/1
80 TABLET ORAL DAILY
Qty: 30 TABLET | Refills: 5 | Status: SHIPPED | OUTPATIENT
Start: 2021-02-12 | End: 2021-11-16

## 2021-02-12 NOTE — TELEPHONE ENCOUNTER
Relayed these instructions to the patient and meds sent to pharmacy. TP          ----- Message from Jesus López MD sent at 2/12/2021  1:40 PM CST -----  Start Diovan 80 mg daily.  Continue to monitor blood pressure at home and notify us of not consistently at goal.  EB  ----- Message -----  From: Jayne Del Rio RN  Sent: 2/12/2021   1:34 PM CST  To: Jesus López MD    She had a visit a couple days ago and b/p was elevated. She has taken it the last few days.   133/94,138/88,146/92,155/96,145/92,138/85    She took her b/p today with me on the phone and said that I was making her nervous.   Her b/p was 168/99. Jayne

## 2021-03-08 ENCOUNTER — OFFICE VISIT (OUTPATIENT)
Dept: SURGERY | Facility: CLINIC | Age: 61
End: 2021-03-08

## 2021-03-08 VITALS
SYSTOLIC BLOOD PRESSURE: 144 MMHG | HEART RATE: 78 BPM | HEIGHT: 64 IN | TEMPERATURE: 97.5 F | DIASTOLIC BLOOD PRESSURE: 82 MMHG | BODY MASS INDEX: 31.28 KG/M2 | WEIGHT: 183.2 LBS

## 2021-03-08 DIAGNOSIS — Z85.3 HX: BREAST CANCER: Primary | ICD-10-CM

## 2021-03-08 DIAGNOSIS — Z12.31 SCREENING MAMMOGRAM FOR HIGH-RISK PATIENT: Primary | ICD-10-CM

## 2021-03-08 PROCEDURE — 99212 OFFICE O/P EST SF 10 MIN: CPT | Performed by: SURGERY

## 2021-03-08 NOTE — PATIENT INSTRUCTIONS
"BMI for Adults  What is BMI?  Body mass index (BMI) is a number that is calculated from a person's weight and height. BMI can help estimate how much of a person's weight is composed of fat. BMI does not measure body fat directly. Rather, it is an alternative to procedures that directly measure body fat, which can be difficult and expensive.  BMI can help identify people who may be at higher risk for certain medical problems.  What are BMI measurements used for?  BMI is used as a screening tool to identify possible weight problems. It helps determine whether a person is obese, overweight, a healthy weight, or underweight.  BMI is useful for:  · Identifying a weight problem that may be related to a medical condition or may increase the risk for medical problems.  · Promoting changes, such as changes in diet and exercise, to help reach a healthy weight. BMI screening can be repeated to see if these changes are working.  How is BMI calculated?  BMI involves measuring your weight in relation to your height. Both height and weight are measured, and the BMI is calculated from those numbers. This can be done either in English ([place] or metric measurements. Note that charts and online BMI calculators are available to help you find your BMI quickly and easily without having to do these calculations yourself.  To calculate your BMI in English (U.S[place] measurements:    1. Measure your weight in pounds (lb).  2. Multiply the number of pounds by 703.  ? For example, for a person who weighs 180 lb, multiply that number by 703, which equals 126,540.  3. Measure your height in inches. Then multiply that number by itself to get a measurement called \"inches squared.\"  ? For example, for a person who is 70 inches tall, the \"inches squared\" measurement is 70 inches x 70 inches, which equals 4,900 inches squared.  4. Divide the total from step 2 (number of lb x 703) by the total from step 3 (inches squared): 126,540 ÷ 4,900 = 25.8. This is " "your BMI.  To calculate your BMI in metric measurements:  1. Measure your weight in kilograms (kg).  2. Measure your height in meters (m). Then multiply that number by itself to get a measurement called \"meters squared.\"  ? For example, for a person who is 1.75 m tall, the \"meters squared\" measurement is 1.75 m x 1.75 m, which is equal to 3.1 meters squared.  3. Divide the number of kilograms (your weight) by the meters squared number. In this example: 70 ÷ 3.1 = 22.6. This is your BMI.  What do the results mean?  BMI charts are used to identify whether you are underweight, normal weight, overweight, or obese. The following guidelines will be used:  · Underweight: BMI less than 18.5.  · Normal weight: BMI between 18.5 and 24.9.  · Overweight: BMI between 25 and 29.9.  · Obese: BMI of 30 or above.  Keep these notes in mind:  · Weight includes both fat and muscle, so someone with a muscular build, such as an athlete, may have a BMI that is higher than 24.9. In cases like these, BMI is not an accurate measure of body fat.  · To determine if excess body fat is the cause of a BMI of 25 or higher, further assessments may need to be done by a health care provider.  · BMI is usually interpreted in the same way for men and women.  Where to find more information  For more information about BMI, including tools to quickly calculate your BMI, go to these websites:  · Centers for Disease Control and Prevention: www.cdc.gov  · American Heart Association: www.heart.org  · National Heart, Lung, and Blood [place]: www.nhlbi.nih.gov  Summary  · Body mass index (BMI) is a number that is calculated from a person's weight and height.  · BMI may help estimate how much of a person's weight is composed of fat. BMI can help identify those who may be at higher risk for certain medical problems.  · BMI can be measured using English measurements or metric measurements.  · BMI charts are used to identify whether you are underweight, normal " weight, overweight, or obese.  This information is not intended to replace advice given to you by your health care provider. Make sure you discuss any questions you have with your health care provider.  Document Revised: 09/09/2020 Document Reviewed: 07/17/2020  Elsevier Patient Education © 2020 [employer]

## 2021-03-08 NOTE — PROGRESS NOTES
Chief Complaint   Patient presents with   • Follow-up     Recheck breast and Mammogram        HPI  Hx of DCIS treated with lumpectomy and RT. Had calcifications LEFT side removed with stereo mammotome. Here for follow up imaging LEFT side.    Study Result    Narrative & Impression   PROCEDURE: US BREAST UNILATERAL LIMITED, MAMMO BREAST DIAGNOSTIC  TOMOSYNTHESIS LEFT     HISTORY: Abnormal mammogram, R92.8 Other abnormal and  inconclusive findings on diagnostic imaging of breast     COMPARISON: Previous mammogram dated 9/22/2020. Previous left  breast ultrasound dated 10/1/2020. Left breast ultrasound guided  biopsy 10/5/2020.     Computer-aided detection was utilized during this exam.   Digital breast tomosynthesis was performed.     FINDINGS:   CC, MLO, and mediolateral views were obtained of the left breast.  A biopsy clip has been placed in the left breast. No suspicious  mammographic abnormality seen.     Ultrasound was performed of the 3:00 axis of the left breast  showing a biopsy clip present. The previously noted cystic and  solid mass is no longer seen.     IMPRESSION:  CONCLUSION:    1.  Postbiopsy changes in the left breast.   2.  Recommend annual screening mammography unless clinical  circumstances dictate earlier evaluation  3.  BI-RADS category 2, benign findings     Electronically signed by:  Lev Reyes MD  3/3/2021 7:44 AM CST  Workstation: [id number]       Past Medical History:   Diagnosis Date   • Allergic rhinitis    • Breast cancer (CMS/HCC)    • Chronic obstructive lung disease (CMS/HCC)     Relatively mild at this point   • Essential (primary) hypertension     Started Diovan, 3/2016      • GERD (gastroesophageal reflux disease)    • History of colon polyps    • Hx of radiation therapy    • Hyperlipidemia     On Pravachol    • Intraductal carcinoma of breast      In situ. Right. Lumpectomy 2013, Dr. Lion. Radiation, Dr. Velasco. On tamoxifen.   • Mild intermittent asthma    • Osteopenia     Last  DEXA 7/2014. Started Caltrate, 7/2015    • Tobacco dependence syndrome        Past Surgical History:   Procedure Laterality Date   • BREAST BIOPSY  10/2020   • BREAST LUMPECTOMY     • BREAST SURGERY  10/08/2013    (2)  Excision of the previously excised medial margin.   9/16/2013 - Automated vacuum-assisted biopsy of the right breast. Sterotactic location guidance. Radiological examination of surgical specimen. Tissue marker placement   • OTHER SURGICAL HISTORY      Biopsy of Cervix  -  abnormal Pap smear cervical biopsy was normal   • OTHER SURGICAL HISTORY  09/23/2013    Remove breast lesion (1). Ultrasound localization of the lesion followed by excision, clip placement and x-ray examination of the surgical specimen   • VAGINAL DELIVERY      x 2         Current Outpatient Medications:   •  albuterol (PROAIR HFA) 108 (90 BASE) MCG/ACT inhaler, Inhale 2 puffs 4 (four) times a day as needed for wheezing. (unconfirmed), Disp: , Rfl:   •  calcium carb-cholecalciferol (Caltrate 600+D3) 600-800 MG-UNIT tablet, Take 1 tablet by mouth 2 (Two) Times a Day With Meals., Disp: , Rfl:   •  Fluticasone Furoate (Arnuity Ellipta) 100 MCG/ACT aerosol powder , Inhale 1 puff Daily., Disp: , Rfl:   •  lansoprazole (PREVACID) 30 MG capsule, Take 30 mg by mouth every morning. (unconfirmed), Disp: , Rfl:   •  montelukast (SINGULAIR) 10 MG tablet, Take 10 mg by mouth Daily., Disp: , Rfl:   •  rosuvastatin (CRESTOR) 10 MG tablet, Take 10 mg by mouth Daily., Disp: , Rfl:   •  valsartan (Diovan) 80 MG tablet, Take 1 tablet by mouth Daily., Disp: 30 tablet, Rfl: 5  •  fexofenadine (ALLEGRA) 180 MG tablet, Take 180 mg by mouth as needed., Disp: , Rfl:   •  pseudoephedrine-guaifenesin (MUCINEX D)  MG per 12 hr tablet, Take  by mouth. Take one tablet every morning and take one tablet with supper as needed for congestion/allergies.  (unconfirmed), Disp: , Rfl:   •  simethicone (MYLICON) 125 MG chewable tablet, Chew 125 mg As Needed., Disp:  , Rfl:     Allergies   Allergen Reactions   • Ceftin [Cefuroxime] Shortness Of Breath   • Ciprofloxacin Shortness Of Breath   • Augmentin [Amoxicillin-Pot Clavulanate] GI Intolerance     N/V       Family History   Problem Relation Age of Onset   • Coronary artery disease Mother    • Prostate cancer Father    • Breast cancer Other         Aunt   • Breast cancer Paternal Aunt    • Endometrial cancer Neg Hx    • Ovarian cancer Neg Hx    • Colon cancer Neg Hx         Colorectal cancer           Physical Exam  Chest:      Breasts: Breasts are symmetrical.         Right: No inverted nipple, mass, nipple discharge, skin change or tenderness.         Left: No inverted nipple, mass, nipple discharge, skin change or tenderness.           ASSESSMENT    Diagnoses and all orders for this visit:    1. HX: breast cancer (Primary)        PLAN    1. 6 month bilateral mammograms and exam              This document has been electronically signed by David Lion MD on March 8, 2021 15:08 CST

## 2021-04-07 RX ORDER — ACYCLOVIR 400 MG/1
400 TABLET ORAL
Qty: 40 TABLET | Refills: 0 | Status: SHIPPED | OUTPATIENT
Start: 2021-04-07 | End: 2021-06-16 | Stop reason: DRUGHIGH

## 2021-04-07 RX ORDER — PRAVASTATIN SODIUM 40 MG
40 TABLET ORAL NIGHTLY
Qty: 30 TABLET | Refills: 11 | Status: SHIPPED | OUTPATIENT
Start: 2021-04-07 | End: 2022-04-19

## 2021-05-21 ENCOUNTER — LAB (OUTPATIENT)
Dept: LAB | Facility: HOSPITAL | Age: 61
End: 2021-05-21

## 2021-05-21 DIAGNOSIS — Z01.818 PREOP TESTING: Primary | ICD-10-CM

## 2021-05-21 LAB — SARS-COV-2 N GENE RESP QL NAA+PROBE: NOT DETECTED

## 2021-05-21 PROCEDURE — 87635 SARS-COV-2 COVID-19 AMP PRB: CPT

## 2021-05-21 PROCEDURE — C9803 HOPD COVID-19 SPEC COLLECT: HCPCS

## 2021-05-24 ENCOUNTER — ANESTHESIA (OUTPATIENT)
Dept: GASTROENTEROLOGY | Facility: HOSPITAL | Age: 61
End: 2021-05-24

## 2021-05-24 ENCOUNTER — HOSPITAL ENCOUNTER (OUTPATIENT)
Facility: HOSPITAL | Age: 61
Setting detail: HOSPITAL OUTPATIENT SURGERY
Discharge: HOME | End: 2021-05-24
Attending: INTERNAL MEDICINE | Admitting: INTERNAL MEDICINE

## 2021-05-24 ENCOUNTER — ANESTHESIA EVENT (OUTPATIENT)
Dept: GASTROENTEROLOGY | Facility: HOSPITAL | Age: 61
End: 2021-05-24

## 2021-05-24 VITALS
WEIGHT: 177.03 LBS | BODY MASS INDEX: 31.37 KG/M2 | TEMPERATURE: 96.5 F | SYSTOLIC BLOOD PRESSURE: 160 MMHG | HEIGHT: 63 IN | DIASTOLIC BLOOD PRESSURE: 94 MMHG | RESPIRATION RATE: 16 BRPM | HEART RATE: 66 BPM | OXYGEN SATURATION: 99 %

## 2021-05-24 DIAGNOSIS — Z12.11 ENCOUNTER FOR SCREENING COLONOSCOPY: ICD-10-CM

## 2021-05-24 PROCEDURE — 25010000002 PROPOFOL 10 MG/ML EMULSION: Performed by: NURSE ANESTHETIST, CERTIFIED REGISTERED

## 2021-05-24 RX ORDER — PROPOFOL 10 MG/ML
VIAL (ML) INTRAVENOUS AS NEEDED
Status: DISCONTINUED | OUTPATIENT
Start: 2021-05-24 | End: 2021-05-24 | Stop reason: SURG

## 2021-05-24 RX ORDER — DEXTROSE AND SODIUM CHLORIDE 5; .45 G/100ML; G/100ML
30 INJECTION, SOLUTION INTRAVENOUS CONTINUOUS PRN
Status: DISCONTINUED | OUTPATIENT
Start: 2021-05-24 | End: 2021-05-24 | Stop reason: HOSPADM

## 2021-05-24 RX ORDER — LIDOCAINE HYDROCHLORIDE 20 MG/ML
INJECTION, SOLUTION INTRAVENOUS AS NEEDED
Status: DISCONTINUED | OUTPATIENT
Start: 2021-05-24 | End: 2021-05-24 | Stop reason: SURG

## 2021-05-24 RX ADMIN — DEXTROSE AND SODIUM CHLORIDE 30 ML/HR: 5; 450 INJECTION, SOLUTION INTRAVENOUS at 12:42

## 2021-05-24 RX ADMIN — PROPOFOL 50 MG: 10 INJECTION, EMULSION INTRAVENOUS at 13:15

## 2021-05-24 RX ADMIN — PROPOFOL 50 MG: 10 INJECTION, EMULSION INTRAVENOUS at 13:14

## 2021-05-24 RX ADMIN — PROPOFOL 80 MG: 10 INJECTION, EMULSION INTRAVENOUS at 13:08

## 2021-05-24 RX ADMIN — PROPOFOL 20 MG: 10 INJECTION, EMULSION INTRAVENOUS at 13:17

## 2021-05-24 RX ADMIN — PROPOFOL 20 MG: 10 INJECTION, EMULSION INTRAVENOUS at 13:12

## 2021-05-24 RX ADMIN — PROPOFOL 20 MG: 10 INJECTION, EMULSION INTRAVENOUS at 13:20

## 2021-05-24 RX ADMIN — PROPOFOL 20 MG: 10 INJECTION, EMULSION INTRAVENOUS at 13:09

## 2021-05-24 RX ADMIN — PROPOFOL 20 MG: 10 INJECTION, EMULSION INTRAVENOUS at 13:10

## 2021-05-24 RX ADMIN — LIDOCAINE HYDROCHLORIDE 80 MG: 20 INJECTION, SOLUTION INTRAVENOUS at 13:08

## 2021-05-24 NOTE — H&P
Dami Darby DO,[facility]  Gastroenterology  Hepatology  Endoscopy  Board Certified in Internal Medicine and gastroenterology  [address], [address]  [place]. [address]  - [phone]   F [phone]     GASTROENTEROLOGY HISTORY AND PHYSICAL  NOTE   DAMI DARBY DO.         SUBJECTIVE:   5/24/2021    Name: Val Waldron  DOD: 1960        Chief Complaint:       Subjective : Screen for colon cancer    Patient is 61 y.o. female presents with desire for elective colonoscopy.      ROS/HISTORY/ CURRENT MEDICATIONS/OBJECTIVE/VS/PE:   Review of Systems:  All systems unremarkable unless specified below.  Constitutional   HENT  Eyes   Respiratory    Cardiovascular  Gastrointestinal   Endocrine  Genitourinary    Musculoskeletal   Skin  Allergic/Immunologic    Neurological    Hematological  Psychiatric/Behavioral    History:     Past Medical History:   Diagnosis Date   • Allergic rhinitis    • Breast cancer (CMS/HCC)    • Chronic obstructive lung disease (CMS/HCC)     Relatively mild at this point   • Essential (primary) hypertension     Started Diovan, 3/2016      • GERD (gastroesophageal reflux disease)    • History of colon polyps    • Hx of radiation therapy    • Hyperlipidemia     On Pravachol    • Intraductal carcinoma of breast      In situ. Right. Lumpectomy 2013, Dr. Lion. Radiation, Dr. Velasco. On tamoxifen.   • Mild intermittent asthma    • Osteopenia     Last DEXA 7/2014. Started Caltrate, 7/2015    • Tobacco dependence syndrome      Past Surgical History:   Procedure Laterality Date   • BREAST BIOPSY  10/2020   • BREAST LUMPECTOMY     • BREAST SURGERY  10/08/2013    (2)  Excision of the previously excised medial margin.   9/16/2013 - Automated vacuum-assisted biopsy of the right breast. Sterotactic location guidance. Radiological examination of surgical specimen. Tissue marker placement   • OTHER SURGICAL HISTORY      Biopsy of Cervix  -  abnormal Pap smear cervical biopsy was  normal   • OTHER SURGICAL HISTORY  09/23/2013    Remove breast lesion (1). Ultrasound localization of the lesion followed by excision, clip placement and x-ray examination of the surgical specimen   • VAGINAL DELIVERY      x 2     Family History   Problem Relation Age of Onset   • Coronary artery disease Mother    • Prostate cancer Father    • Breast cancer Other         Aunt   • Breast cancer Paternal Aunt    • Endometrial cancer Neg Hx    • Ovarian cancer Neg Hx    • Colon cancer Neg Hx         Colorectal cancer     Social History     Tobacco Use   • Smoking status: Former Smoker     Types: Cigarettes   • Smokeless tobacco: Never Used   • Tobacco comment: Tobacco reviewed with patient 3/15/2016   Vaping Use   • Vaping Use: Some days   • Substances: Nicotine   Substance Use Topics   • Alcohol use: Yes     Comment: drinks wine daily   • Drug use: No     Prior to Admission medications    Medication Sig Start Date End Date Taking? Authorizing Provider   albuterol (PROAIR HFA) 108 (90 BASE) MCG/ACT inhaler Inhale 2 puffs 4 (four) times a day as needed for wheezing. (unconfirmed)   Yes Shiraz Ye MD   calcium carb-cholecalciferol (Caltrate 600+D3) 600-800 MG-UNIT tablet Take 1 tablet by mouth 2 (Two) Times a Day With Meals.   Yes Shiraz Ye MD   fexofenadine (ALLEGRA) 180 MG tablet Take 180 mg by mouth Daily As Needed.   Yes Shiraz Ye MD   Fluticasone Furoate (Arnuity Ellipta) 100 MCG/ACT aerosol powder  Inhale 1 puff Daily. 9/22/20  Yes Shiraz Ye MD   lansoprazole (PREVACID) 30 MG capsule Take 30 mg by mouth every morning. (unconfirmed)   Yes Shiraz Ye MD   montelukast (SINGULAIR) 10 MG tablet Take 10 mg by mouth Daily. 9/22/20  Yes Shiraz Ye MD   pravastatin (Pravachol) 40 MG tablet Take 1 tablet by mouth Every Night. 4/7/21  Yes Jesus López MD   simethicone (MYLICON) 125 MG chewable tablet Chew 125 mg As Needed.   Yes Shiraz Ye MD    valsartan (Diovan) 80 MG tablet Take 1 tablet by mouth Daily.  Patient taking differently: Take 80 mg by mouth Every Night. 2/12/21  Yes Jesus López MD   acyclovir (Zovirax) 400 MG tablet Take 1 tablet by mouth Every 4 (Four) Hours While Awake. Take no more than 5 doses a day. 4/7/21   Jesus López MD   pseudoephedrine-guaifenesin (MUCINEX D)  MG per 12 hr tablet Take  by mouth. Take one tablet every morning and take one tablet with supper as needed for congestion/allergies.  (unconfirmed)    Provider, MD Shiraz     Allergies:  Ceftin [cefuroxime], Ciprofloxacin, and Augmentin [amoxicillin-pot clavulanate]    I have reviewed the patients medical history, surgical history and family history in the available medical record system.     Current Medications:     Current Facility-Administered Medications   Medication Dose Route Frequency Provider Last Rate Last Admin   • dextrose 5 % and sodium chloride 0.45 % infusion  30 mL/hr Intravenous Continuous PRN Rey Darby DO 30 mL/hr at 05/24/21 1242 30 mL/hr at 05/24/21 1242       Objective     Physical Exam:   Temp:  [96.5 °F (35.8 °C)] 96.5 °F (35.8 °C)  Heart Rate:  [66] 66  Resp:  [16] 16  BP: (160)/(94) 160/94    Physical Exam:  General Appearance:    Alert, cooperative, in no acute distress   Head:    Normocephalic, without obvious abnormality, atraumatic   Eyes:            Lids and lashes normal, conjunctivae and sclerae normal, no icterus, no pallor, corneas clear, PERRLA   Ears:    Ears appear intact with no abnormalities noted   Throat:   No oral lesions, no thrush, oral mucosa moist   Neck:   No adenopathy, supple, trachea midline, no thyromegaly, no  carotid bruit, no JVD   Back:     No kyphosis present, no scoliosis present, no skin lesions,   erythema or scars, no tenderness to percussion or                 palpation,  range of motion normal   Lungs:     Clear to auscultation,respirations regular, even and         unlabored    Heart:     Regular rhythm and normal rate, normal S1 and S2, no  murmur, no gallop, no rub, no click   Breast Exam:    Deferred   Abdomen:     Normal bowel sounds, no masses, no organomegaly, soft  nontender, nondistended, no guarding, no rebound                 tenderness   Genitalia:    Deferred   Extremities:   Moves all extremities well, no edema, no cyanosis, no          redness   Pulses:   Pulses palpable and equal bilaterally   Skin:   No bleeding, bruising or rash   Lymph nodes:   No palpable adenopathy   Neurologic:   Cranial nerves 2 - 12 grossly intact, sensation intact, DTR     present and equal bilaterally      Results Review:     Lab Results   Component Value Date    WBC 7.5 12/31/2018    WBC 9.47 03/17/2017    WBC 8.2 07/12/2016    HGB 13.5 12/31/2018    HGB 13.4 03/17/2017    HGB 13.5 07/12/2016    HCT 42.3 12/31/2018    HCT 39.1 03/17/2017    HCT 40.5 07/12/2016     12/31/2018     03/17/2017     07/12/2016             No results found for: LIPASE  No results found for: INR  No results found for: CULTURE    Radiology Review:  Imaging Results (Last 72 Hours)     ** No results found for the last 72 hours. **           I reviewed the patient's new clinical results.  I reviewed the patient's new imaging results and agree with the interpretation.     ASSESSMENT/PLAN:   ASSESSMENT:  1.  Screen for colon cancer    PLAN:  1.  Colonoscopy    Risk and benefits associated with the procedure are reviewed with the patient.  The patient wished to proceed     Rey Darby DO  05/24/21  13:03 CDT

## 2021-05-24 NOTE — ANESTHESIA PREPROCEDURE EVALUATION
Anesthesia Evaluation     Patient summary reviewed and Nursing notes reviewed   NPO Solid Status: > 8 hours  NPO Liquid Status: > 4 hours           Airway   Mallampati: II  TM distance: >3 FB  Neck ROM: full  No difficulty expected  Dental - normal exam     Pulmonary - normal exam    breath sounds clear to auscultation  (+) a smoker Former, asthma,  Cardiovascular - normal exam  Exercise tolerance: good (4-7 METS)    Rhythm: regular  Rate: normal    (+) hypertension well controlled, hyperlipidemia,       Neuro/Psych- negative ROS  GI/Hepatic/Renal/Endo    (+)  GERD,      Musculoskeletal (-) negative ROS    Abdominal  - normal exam   Substance History   (+) alcohol use (2 glasses of wine a day),      OB/GYN negative ob/gyn ROS         Other      history of cancer remission                    Anesthesia Plan    ASA 2     MAC       Anesthetic plan, all risks, benefits, and alternatives have been provided, discussed and informed consent has been obtained with: patient.

## 2021-05-24 NOTE — ANESTHESIA POSTPROCEDURE EVALUATION
Patient: Val Waldron    Procedure Summary     Date: 05/24/21 Room / Location: [facility] ENDOSCOPY 2 / [facility] ENDOSCOPY    Anesthesia Start: 1306 Anesthesia Stop: 1325    Procedure: COLONOSCOPY (N/A ) Diagnosis:       Encounter for screening colonoscopy      (Encounter for screening colonoscopy [Z12.11])    Surgeons: Rey Darby DO Provider: Slim Hernández CRNA    Anesthesia Type: MAC ASA Status: 2          Anesthesia Type: MAC    Vitals  No vitals data found for the desired time range.          Post Anesthesia Care and Evaluation    Patient location during evaluation: bedside  Patient participation: waiting for patient participation  Level of consciousness: responsive to verbal stimuli  Pain management: adequate  Airway patency: patent  Anesthetic complications: No anesthetic complications  PONV Status: none  Cardiovascular status: acceptable  Respiratory status: acceptable  Hydration status: acceptable    Comments: ---------------------------               05/24/21                      1326         ---------------------------   BP:          167/84         Pulse:         62         Resp:          18          Temp:   96.5 °F (35.8 °C)   SpO2:          96%         ---------------------------

## 2021-05-27 LAB
LAB AP CASE REPORT: NORMAL
PATH REPORT.FINAL DX SPEC: NORMAL

## 2021-06-09 ENCOUNTER — LAB (OUTPATIENT)
Dept: LAB | Facility: OTHER | Age: 61
End: 2021-06-09

## 2021-06-09 DIAGNOSIS — E53.8 VITAMIN B12 DEFICIENCY: ICD-10-CM

## 2021-06-09 DIAGNOSIS — I10 ESSENTIAL (PRIMARY) HYPERTENSION: Chronic | ICD-10-CM

## 2021-06-09 DIAGNOSIS — M85.852 OSTEOPENIA OF LEFT HIP: Chronic | ICD-10-CM

## 2021-06-09 DIAGNOSIS — J01.00 ACUTE NON-RECURRENT MAXILLARY SINUSITIS: ICD-10-CM

## 2021-06-09 DIAGNOSIS — E78.2 MIXED HYPERLIPIDEMIA: Chronic | ICD-10-CM

## 2021-06-09 LAB
ALBUMIN SERPL-MCNC: 4.2 G/DL (ref 3.5–5)
ALBUMIN/GLOB SERPL: 1.2 G/DL (ref 1.1–1.8)
ALP SERPL-CCNC: 71 U/L (ref 38–126)
ALT SERPL W P-5'-P-CCNC: 26 U/L
ANION GAP SERPL CALCULATED.3IONS-SCNC: 6 MMOL/L (ref 5–15)
AST SERPL-CCNC: 33 U/L (ref 14–36)
BASOPHILS # BLD AUTO: 0.06 10*3/MM3 (ref 0–0.2)
BASOPHILS NFR BLD AUTO: 0.9 % (ref 0–1.5)
BILIRUB SERPL-MCNC: 0.3 MG/DL (ref 0.2–1.3)
BUN SERPL-MCNC: 14 MG/DL (ref 7–23)
BUN/CREAT SERPL: 19.7 (ref 7–25)
CALCIUM SPEC-SCNC: 9.4 MG/DL (ref 8.4–10.2)
CHLORIDE SERPL-SCNC: 102 MMOL/L (ref 101–112)
CHOLEST SERPL-MCNC: 183 MG/DL (ref 150–200)
CO2 SERPL-SCNC: 30 MMOL/L (ref 22–30)
CREAT SERPL-MCNC: 0.71 MG/DL (ref 0.52–1.04)
DEPRECATED RDW RBC AUTO: 49.7 FL (ref 37–54)
EOSINOPHIL # BLD AUTO: 0.14 10*3/MM3 (ref 0–0.4)
EOSINOPHIL NFR BLD AUTO: 2 % (ref 0.3–6.2)
ERYTHROCYTE [DISTWIDTH] IN BLOOD BY AUTOMATED COUNT: 14.9 % (ref 12.3–15.4)
GFR SERPL CREATININE-BSD FRML MDRD: 83 ML/MIN/1.73 (ref 45–104)
GLOBULIN UR ELPH-MCNC: 3.4 GM/DL (ref 2.3–3.5)
GLUCOSE SERPL-MCNC: 108 MG/DL (ref 70–99)
HCT VFR BLD AUTO: 40.7 % (ref 34–46.6)
HDLC SERPL-MCNC: 62 MG/DL (ref 40–59)
HGB BLD-MCNC: 13.6 G/DL (ref 12–15.9)
LDLC SERPL CALC-MCNC: 101 MG/DL
LDLC/HDLC SERPL: 1.59 {RATIO} (ref 0–3.22)
LYMPHOCYTES # BLD AUTO: 1.91 10*3/MM3 (ref 0.7–3.1)
LYMPHOCYTES NFR BLD AUTO: 28 % (ref 19.6–45.3)
MCH RBC QN AUTO: 31.3 PG (ref 26.6–33)
MCHC RBC AUTO-ENTMCNC: 33.4 G/DL (ref 31.5–35.7)
MCV RBC AUTO: 93.8 FL (ref 79–97)
MONOCYTES # BLD AUTO: 0.81 10*3/MM3 (ref 0.1–0.9)
MONOCYTES NFR BLD AUTO: 11.9 % (ref 5–12)
NEUTROPHILS NFR BLD AUTO: 3.91 10*3/MM3 (ref 1.7–7)
NEUTROPHILS NFR BLD AUTO: 57.2 % (ref 42.7–76)
PLATELET # BLD AUTO: 394 10*3/MM3 (ref 140–450)
PMV BLD AUTO: 9.1 FL (ref 6–12)
POTASSIUM SERPL-SCNC: 4.5 MMOL/L (ref 3.4–5)
PROT SERPL-MCNC: 7.6 G/DL (ref 6.3–8.6)
RBC # BLD AUTO: 4.34 10*6/MM3 (ref 3.77–5.28)
SODIUM SERPL-SCNC: 138 MMOL/L (ref 137–145)
TRIGL SERPL-MCNC: 113 MG/DL
VLDLC SERPL-MCNC: 20 MG/DL (ref 5–40)
WBC # BLD AUTO: 6.83 10*3/MM3 (ref 3.4–10.8)

## 2021-06-09 PROCEDURE — 85025 COMPLETE CBC W/AUTO DIFF WBC: CPT | Performed by: INTERNAL MEDICINE

## 2021-06-09 PROCEDURE — 80061 LIPID PANEL: CPT | Performed by: INTERNAL MEDICINE

## 2021-06-09 PROCEDURE — 84443 ASSAY THYROID STIM HORMONE: CPT | Performed by: INTERNAL MEDICINE

## 2021-06-09 PROCEDURE — 36415 COLL VENOUS BLD VENIPUNCTURE: CPT | Performed by: INTERNAL MEDICINE

## 2021-06-09 PROCEDURE — 82607 VITAMIN B-12: CPT | Performed by: INTERNAL MEDICINE

## 2021-06-09 PROCEDURE — 80053 COMPREHEN METABOLIC PANEL: CPT | Performed by: INTERNAL MEDICINE

## 2021-06-09 PROCEDURE — 82306 VITAMIN D 25 HYDROXY: CPT | Performed by: INTERNAL MEDICINE

## 2021-06-10 LAB
25(OH)D3 SERPL-MCNC: 29.2 NG/ML (ref 30–100)
TSH SERPL DL<=0.05 MIU/L-ACNC: 1.61 UIU/ML (ref 0.27–4.2)
VIT B12 BLD-MCNC: 388 PG/ML (ref 211–946)

## 2021-06-16 ENCOUNTER — OFFICE VISIT (OUTPATIENT)
Dept: FAMILY MEDICINE CLINIC | Facility: CLINIC | Age: 61
End: 2021-06-16

## 2021-06-16 VITALS
HEART RATE: 90 BPM | TEMPERATURE: 97.6 F | SYSTOLIC BLOOD PRESSURE: 118 MMHG | HEIGHT: 63 IN | BODY MASS INDEX: 31.36 KG/M2 | WEIGHT: 177 LBS | OXYGEN SATURATION: 98 % | DIASTOLIC BLOOD PRESSURE: 82 MMHG

## 2021-06-16 DIAGNOSIS — D05.11 DUCTAL CARCINOMA IN SITU (DCIS) OF RIGHT BREAST: Chronic | ICD-10-CM

## 2021-06-16 DIAGNOSIS — Z86.010 HISTORY OF COLON POLYPS: ICD-10-CM

## 2021-06-16 DIAGNOSIS — M85.852 OSTEOPENIA OF LEFT HIP: Chronic | ICD-10-CM

## 2021-06-16 DIAGNOSIS — Z23 NEED FOR DIPHTHERIA-TETANUS-PERTUSSIS (TDAP) VACCINE: ICD-10-CM

## 2021-06-16 DIAGNOSIS — S29.019A THORACIC MYOFASCIAL STRAIN, INITIAL ENCOUNTER: ICD-10-CM

## 2021-06-16 DIAGNOSIS — J43.9 PULMONARY EMPHYSEMA, UNSPECIFIED EMPHYSEMA TYPE (HCC): Chronic | ICD-10-CM

## 2021-06-16 DIAGNOSIS — K21.9 GASTROESOPHAGEAL REFLUX DISEASE, UNSPECIFIED WHETHER ESOPHAGITIS PRESENT: Chronic | ICD-10-CM

## 2021-06-16 DIAGNOSIS — R53.83 FATIGUE, UNSPECIFIED TYPE: ICD-10-CM

## 2021-06-16 DIAGNOSIS — Z11.59 ENCOUNTER FOR HEPATITIS C SCREENING TEST FOR LOW RISK PATIENT: ICD-10-CM

## 2021-06-16 DIAGNOSIS — E78.2 MIXED HYPERLIPIDEMIA: Primary | Chronic | ICD-10-CM

## 2021-06-16 DIAGNOSIS — I10 ESSENTIAL (PRIMARY) HYPERTENSION: Chronic | ICD-10-CM

## 2021-06-16 PROCEDURE — 90715 TDAP VACCINE 7 YRS/> IM: CPT | Performed by: INTERNAL MEDICINE

## 2021-06-16 PROCEDURE — 99215 OFFICE O/P EST HI 40 MIN: CPT | Performed by: INTERNAL MEDICINE

## 2021-06-16 PROCEDURE — 90471 IMMUNIZATION ADMIN: CPT | Performed by: INTERNAL MEDICINE

## 2021-06-16 RX ORDER — AZELASTINE 1 MG/ML
SPRAY, METERED NASAL
COMMUNITY
Start: 2021-03-16

## 2021-06-16 RX ORDER — ACYCLOVIR 800 MG/1
TABLET ORAL
COMMUNITY
Start: 2021-04-08 | End: 2023-01-09

## 2021-06-16 NOTE — PROGRESS NOTES
Chief Complaint  Hyperlipidemia (4 month fu - review labs ), Hypertension, and Osteopenia    Subjective          History of Present Illness     Val Waldron presents to the clinic for overdue follow up on chronic medical issues including hyperlipidemia, hypertension, GERD, and COPD among other issues.   Patient is a former smoker, but has been successful with continued cessation efforts, but may still use vaping of nicotine at times.  Dr. Hartley is following her chronic allergic rhinitis and mild intermittent asthma.  She is not currently requiring an inhaler.      She reports a recent flare of thoracic back strain/muscle spasm, for which she started having chiropractic manipulations by Dr. Contreras last week.  Exam reveals right thoracic paraspinal muscle spasm thoracic spine, for which I recommended massage therapy.        Patient had colonoscopy by Dr. Darby 05/24/2021 revealing one 8 mm polyp at the ileocecal valve and a 4 mm poly in sigmoid colon, diveticulosis in sigmoid and descending colon, and internal hemorrhoids.  Constipation managed with daily metamucil.   GERD symptoms adequately managed with Prevacid with only rare breakthrough with spicy, higher fat foods.    Cholesterol is considerably improved with total cholesterol 220, improved to 183. LDL slightly above goal at 101, although, her high HDL gives patient a favorable LDL/HDL ratio at 1.59.   Patient has resumed walking for exercise and is attending water aerobic classes three times weekly at the [employer].     Blood pressure at goal with 40 mg of Diovan daily.       Patient is due TDap and is given the vaccine today in the office.     Vitamin D slightly low at 29.2, although, patient admits she stopped her calcium/vitamin D supplement prior to colonoscopy last month and forgot to restart.   Colonoscopy revealed a benign polyp, with 10-year repeat colonoscopy recommended    The patient's relevant past medical, surgical, and  "social history was reviewed in Epic.   Lab results are reviewed with the patient today.  CBC unremarkable other than slightly elevated fasting glucose.  Normal renal and liver function.  Normal thyroid screen.  B-12 marginal at 388.    Objective   Vital Signs:   /82 (BP Location: Left arm, Patient Position: Sitting, Cuff Size: Adult)   Pulse 90   Temp 97.6 °F (36.4 °C) (Tympanic)   Ht 160 cm (63\")   Wt 80.3 kg (177 lb)   SpO2 98%   BMI 31.35 kg/m²       Physical Exam  Vitals reviewed.   Constitutional:       General: She is not in acute distress.     Appearance: She is well-developed.      Comments: Pleasant female, obese.    HENT:      Head: Normocephalic and atraumatic.      Nose:      Right Sinus: No maxillary sinus tenderness or frontal sinus tenderness.      Left Sinus: No maxillary sinus tenderness or frontal sinus tenderness.      Mouth/Throat:      Mouth: No oral lesions.      Pharynx: Uvula midline.      Tonsils: No tonsillar exudate.   Eyes:      Conjunctiva/sclera: Conjunctivae normal.      Pupils: Pupils are equal, round, and reactive to light.   Neck:      Thyroid: No thyroid mass or thyromegaly.      Vascular: No carotid bruit or JVD.      Trachea: Trachea normal. No tracheal deviation.   Cardiovascular:      Rate and Rhythm: Normal rate and regular rhythm.  No extrasystoles are present.     Chest Wall: PMI is not displaced.      Heart sounds: Normal heart sounds. No murmur heard.     Pulmonary:      Effort: Pulmonary effort is normal. No accessory muscle usage or respiratory distress.      Breath sounds: Normal breath sounds. No decreased breath sounds, wheezing, rhonchi or rales.      Comments: Chronic lung sounds.   Abdominal:      General: Bowel sounds are normal. There is no distension.      Palpations: Abdomen is soft.      Tenderness: There is no abdominal tenderness.   Musculoskeletal:      Cervical back: Neck supple.      Comments: Paraspinal muscle spasm on thoracic region on the " right.      Lymphadenopathy:      Cervical: No cervical adenopathy.   Skin:     General: Skin is warm and dry.      Findings: No rash.      Nails: There is no clubbing.   Neurological:      Mental Status: She is alert and oriented to person, place, and time.      Cranial Nerves: No cranial nerve deficit.      Coordination: Coordination normal.   Psychiatric:         Speech: Speech normal.         Behavior: Behavior normal.         Thought Content: Thought content normal.         Judgment: Judgment normal.            Result Review :     CMP    CMP 6/9/21   Glucose 108 (A)   BUN 14   Creatinine 0.71   eGFR Non African Am 83   Sodium 138   Potassium 4.5   Chloride 102   Calcium 9.4   Albumin 4.20   Total Bilirubin 0.3   Alkaline Phosphatase 71   AST (SGOT) 33   ALT (SGPT) 26   (A) Abnormal value            CBC w/diff    CBC w/Diff 6/9/21   WBC 6.83   RBC 4.34   Hemoglobin 13.6   Hematocrit 40.7   MCV 93.8   MCH 31.3   MCHC 33.4   RDW 14.9   Platelets 394   Neutrophil Rel % 57.2   Lymphocyte Rel % 28.0   Monocyte Rel % 11.9   Eosinophil Rel % 2.0   Basophil Rel % 0.9           Lipid Panel    Lipid Panel 6/9/21   Total Cholesterol 183   Triglycerides 113   HDL Cholesterol 62 (A)   VLDL Cholesterol 20   LDL Cholesterol  101 (A)   LDL/HDL Ratio 1.59   (A) Abnormal value            TSH    TSH 6/9/21   TSH 1.610               Data reviewed: GI studies colonoscopy 05/24/2021          Assessment and Plan    Diagnoses and all orders for this visit:    1. Mixed hyperlipidemia (Primary)  -     CBC Auto Differential; Future  -     Comprehensive Metabolic Panel; Future  -     Lipid Panel; Future  -     TSH; Future    2. Need for diphtheria-tetanus-pertussis (Tdap) vaccine  -     Tdap Vaccine Greater Than or Equal To 8yo IM    3. Essential (primary) hypertension  -     Comprehensive Metabolic Panel; Future    4. Gastroesophageal reflux disease, unspecified whether esophagitis present  -     CBC Auto Differential; Future    5.  Osteopenia of left hip  -     Vitamin D 25 Hydroxy; Future    6. Ductal carcinoma in situ (DCIS) of right breast    7. Pulmonary emphysema, unspecified emphysema type (CMS/HCC)  -     CBC Auto Differential; Future    8. Fatigue, unspecified type  -     Vitamin B12; Future    9. Encounter for hepatitis C screening test for low risk patient  -     Hepatitis C Antibody; Future    10. Thoracic myofascial strain, initial encounter    11. History of colon polyps           I spent *42 minutes caring for Val on this date of service. This time includes time spent by me in the following activities:preparing for the visit, reviewing tests, obtaining and/or reviewing a separately obtained history, performing a medically appropriate examination and/or evaluation , counseling and educating the patient/family/caregiver, ordering medications, tests, or procedures, documenting information in the medical record, independently interpreting results and communicating that information with the patient/family/caregiver and care coordination     After informed verbal consent, patient is given TDap.  She tolerated well.     Resume the calcium + vitamin D supplement daily.      Continue Diovan 40 mg q.d.  Pursue weight loss and continue with regular exercise with walking and water aerobics, which will also help delay progression of impaired fasting glucose.     Recommended massage therapy to help relieve muscle spasm in thoracic spine.  We provided number for local massage therapist. She can continue with chiropractic manipulations p.r.n.      Continue the management plan for allergic rhinitis and COPD with mild intermittent asthma followed by Dr. Hartley.  Refrain from use of all tobacco products.  I would avoid vaping as well.    Cholesterol significantly improved.  Continue the Crestor.  Pursue dietary efforts.     We will continue to monitor B-12 level, which is marginal.    Screen for hepatitis C exposure with next labs in this low  risk patient.    Continue to follow with Dr. iLon due to history of breast cancer    Return in six months for routine follow up with fasting labs one week prior.        Follow Up   Return in about 6 months (around 12/16/2021) for Follow up in six months with labs one week prior..  Patient was given instructions and counseling regarding her condition or for health maintenance advice. Please see specific information pulled into the AVS if appropriate.

## 2021-06-16 NOTE — PATIENT INSTRUCTIONS

## 2021-06-25 RX ORDER — TIZANIDINE 4 MG/1
TABLET ORAL
Qty: 30 TABLET | Refills: 0 | Status: SHIPPED | OUTPATIENT
Start: 2021-06-25 | End: 2023-01-09

## 2021-11-10 ENCOUNTER — CLINICAL SUPPORT (OUTPATIENT)
Dept: FAMILY MEDICINE CLINIC | Facility: CLINIC | Age: 61
End: 2021-11-10

## 2021-11-10 DIAGNOSIS — Z23 FLU VACCINE NEED: Primary | ICD-10-CM

## 2021-11-10 PROCEDURE — 90471 IMMUNIZATION ADMIN: CPT | Performed by: INTERNAL MEDICINE

## 2021-11-10 PROCEDURE — 90686 IIV4 VACC NO PRSV 0.5 ML IM: CPT | Performed by: INTERNAL MEDICINE

## 2021-11-16 RX ORDER — VALSARTAN 80 MG/1
80 TABLET ORAL NIGHTLY
Qty: 30 TABLET | Refills: 5 | Status: SHIPPED | OUTPATIENT
Start: 2021-11-16 | End: 2022-05-13 | Stop reason: SDUPTHER

## 2021-11-16 RX ORDER — FLUTICASONE FUROATE 100 UG/1
1 POWDER RESPIRATORY (INHALATION) DAILY
Qty: 30 EACH | Refills: 5 | Status: SHIPPED | OUTPATIENT
Start: 2021-11-16 | End: 2022-06-27

## 2021-12-20 ENCOUNTER — LAB (OUTPATIENT)
Dept: LAB | Facility: OTHER | Age: 61
End: 2021-12-20

## 2021-12-20 DIAGNOSIS — Z11.59 ENCOUNTER FOR HEPATITIS C SCREENING TEST FOR LOW RISK PATIENT: ICD-10-CM

## 2021-12-20 DIAGNOSIS — I10 ESSENTIAL (PRIMARY) HYPERTENSION: Chronic | ICD-10-CM

## 2021-12-20 DIAGNOSIS — M85.852 OSTEOPENIA OF LEFT HIP: Chronic | ICD-10-CM

## 2021-12-20 DIAGNOSIS — K21.9 GASTROESOPHAGEAL REFLUX DISEASE, UNSPECIFIED WHETHER ESOPHAGITIS PRESENT: Chronic | ICD-10-CM

## 2021-12-20 DIAGNOSIS — R53.83 FATIGUE, UNSPECIFIED TYPE: ICD-10-CM

## 2021-12-20 DIAGNOSIS — E78.2 MIXED HYPERLIPIDEMIA: Chronic | ICD-10-CM

## 2021-12-20 DIAGNOSIS — J43.9 PULMONARY EMPHYSEMA, UNSPECIFIED EMPHYSEMA TYPE (HCC): Chronic | ICD-10-CM

## 2021-12-20 LAB
ALBUMIN SERPL-MCNC: 4.1 G/DL (ref 3.5–5)
ALBUMIN/GLOB SERPL: 1.3 G/DL (ref 1.1–1.8)
ALP SERPL-CCNC: 68 U/L (ref 38–126)
ALT SERPL W P-5'-P-CCNC: 23 U/L
ANION GAP SERPL CALCULATED.3IONS-SCNC: 5 MMOL/L (ref 5–15)
AST SERPL-CCNC: 28 U/L (ref 14–36)
BASOPHILS # BLD AUTO: 0.07 10*3/MM3 (ref 0–0.2)
BASOPHILS NFR BLD AUTO: 1 % (ref 0–1.5)
BILIRUB SERPL-MCNC: 0.2 MG/DL (ref 0.2–1.3)
BUN SERPL-MCNC: 11 MG/DL (ref 7–23)
BUN/CREAT SERPL: 17.5 (ref 7–25)
CALCIUM SPEC-SCNC: 9.7 MG/DL (ref 8.4–10.2)
CHLORIDE SERPL-SCNC: 103 MMOL/L (ref 101–112)
CHOLEST SERPL-MCNC: 188 MG/DL (ref 150–200)
CO2 SERPL-SCNC: 29 MMOL/L (ref 22–30)
CREAT SERPL-MCNC: 0.63 MG/DL (ref 0.52–1.04)
DEPRECATED RDW RBC AUTO: 48.6 FL (ref 37–54)
EOSINOPHIL # BLD AUTO: 0.11 10*3/MM3 (ref 0–0.4)
EOSINOPHIL NFR BLD AUTO: 1.5 % (ref 0.3–6.2)
ERYTHROCYTE [DISTWIDTH] IN BLOOD BY AUTOMATED COUNT: 14.8 % (ref 12.3–15.4)
GFR SERPL CREATININE-BSD FRML MDRD: 95 ML/MIN/1.73 (ref 45–104)
GLOBULIN UR ELPH-MCNC: 3.2 GM/DL (ref 2.3–3.5)
GLUCOSE SERPL-MCNC: 100 MG/DL (ref 70–99)
HCT VFR BLD AUTO: 40 % (ref 34–46.6)
HDLC SERPL-MCNC: 68 MG/DL (ref 40–59)
HGB BLD-MCNC: 13.1 G/DL (ref 12–15.9)
LDLC SERPL CALC-MCNC: 98 MG/DL
LDLC/HDLC SERPL: 1.39 {RATIO} (ref 0–3.22)
LYMPHOCYTES # BLD AUTO: 2.36 10*3/MM3 (ref 0.7–3.1)
LYMPHOCYTES NFR BLD AUTO: 32.2 % (ref 19.6–45.3)
MCH RBC QN AUTO: 30.4 PG (ref 26.6–33)
MCHC RBC AUTO-ENTMCNC: 32.8 G/DL (ref 31.5–35.7)
MCV RBC AUTO: 92.8 FL (ref 79–97)
MONOCYTES # BLD AUTO: 0.75 10*3/MM3 (ref 0.1–0.9)
MONOCYTES NFR BLD AUTO: 10.2 % (ref 5–12)
NEUTROPHILS NFR BLD AUTO: 4.04 10*3/MM3 (ref 1.7–7)
NEUTROPHILS NFR BLD AUTO: 55.1 % (ref 42.7–76)
PLATELET # BLD AUTO: 349 10*3/MM3 (ref 140–450)
PMV BLD AUTO: 9.3 FL (ref 6–12)
POTASSIUM SERPL-SCNC: 4.6 MMOL/L (ref 3.4–5)
PROT SERPL-MCNC: 7.3 G/DL (ref 6.3–8.6)
RBC # BLD AUTO: 4.31 10*6/MM3 (ref 3.77–5.28)
SODIUM SERPL-SCNC: 137 MMOL/L (ref 137–145)
TRIGL SERPL-MCNC: 128 MG/DL
VLDLC SERPL-MCNC: 22 MG/DL (ref 5–40)
WBC NRBC COR # BLD: 7.33 10*3/MM3 (ref 3.4–10.8)

## 2021-12-20 PROCEDURE — 82607 VITAMIN B-12: CPT | Performed by: INTERNAL MEDICINE

## 2021-12-20 PROCEDURE — 80053 COMPREHEN METABOLIC PANEL: CPT | Performed by: INTERNAL MEDICINE

## 2021-12-20 PROCEDURE — 84443 ASSAY THYROID STIM HORMONE: CPT | Performed by: INTERNAL MEDICINE

## 2021-12-20 PROCEDURE — 86803 HEPATITIS C AB TEST: CPT | Performed by: INTERNAL MEDICINE

## 2021-12-20 PROCEDURE — 36415 COLL VENOUS BLD VENIPUNCTURE: CPT | Performed by: INTERNAL MEDICINE

## 2021-12-20 PROCEDURE — 80061 LIPID PANEL: CPT | Performed by: INTERNAL MEDICINE

## 2021-12-20 PROCEDURE — 85025 COMPLETE CBC W/AUTO DIFF WBC: CPT | Performed by: INTERNAL MEDICINE

## 2021-12-20 PROCEDURE — 82306 VITAMIN D 25 HYDROXY: CPT | Performed by: INTERNAL MEDICINE

## 2021-12-21 LAB
25(OH)D3 SERPL-MCNC: 26.3 NG/ML (ref 30–100)
HCV AB SER DONR QL: NORMAL
TSH SERPL DL<=0.05 MIU/L-ACNC: 1.72 UIU/ML (ref 0.27–4.2)
VIT B12 BLD-MCNC: 422 PG/ML (ref 211–946)

## 2021-12-27 ENCOUNTER — OFFICE VISIT (OUTPATIENT)
Dept: FAMILY MEDICINE CLINIC | Facility: CLINIC | Age: 61
End: 2021-12-27

## 2021-12-27 VITALS
DIASTOLIC BLOOD PRESSURE: 82 MMHG | TEMPERATURE: 97.9 F | BODY MASS INDEX: 31.86 KG/M2 | HEART RATE: 76 BPM | WEIGHT: 179.8 LBS | SYSTOLIC BLOOD PRESSURE: 120 MMHG | HEIGHT: 63 IN

## 2021-12-27 DIAGNOSIS — I10 ESSENTIAL (PRIMARY) HYPERTENSION: Chronic | ICD-10-CM

## 2021-12-27 DIAGNOSIS — E78.2 MIXED HYPERLIPIDEMIA: Primary | Chronic | ICD-10-CM

## 2021-12-27 DIAGNOSIS — E53.8 VITAMIN B12 DEFICIENCY: Chronic | ICD-10-CM

## 2021-12-27 DIAGNOSIS — M85.852 OSTEOPENIA OF LEFT HIP: Chronic | ICD-10-CM

## 2021-12-27 DIAGNOSIS — D05.11 DUCTAL CARCINOMA IN SITU (DCIS) OF RIGHT BREAST: Chronic | ICD-10-CM

## 2021-12-27 DIAGNOSIS — J32.1 CHRONIC FRONTAL SINUSITIS: ICD-10-CM

## 2021-12-27 DIAGNOSIS — R73.01 IFG (IMPAIRED FASTING GLUCOSE): Chronic | ICD-10-CM

## 2021-12-27 DIAGNOSIS — J43.9 PULMONARY EMPHYSEMA, UNSPECIFIED EMPHYSEMA TYPE (HCC): Chronic | ICD-10-CM

## 2021-12-27 DIAGNOSIS — E55.9 VITAMIN D DEFICIENCY: Chronic | ICD-10-CM

## 2021-12-27 PROBLEM — N81.11 MIDLINE CYSTOCELE: Status: ACTIVE | Noted: 2019-01-08

## 2021-12-27 PROBLEM — J32.0 CHRONIC MAXILLARY SINUSITIS: Chronic | Status: ACTIVE | Noted: 2021-12-27

## 2021-12-27 PROCEDURE — 99215 OFFICE O/P EST HI 40 MIN: CPT | Performed by: INTERNAL MEDICINE

## 2021-12-27 RX ORDER — FLUTICASONE PROPIONATE 50 MCG
1 SPRAY, SUSPENSION (ML) NASAL 2 TIMES DAILY
Qty: 15.8 ML | Refills: 6 | Status: SHIPPED | OUTPATIENT
Start: 2021-12-27 | End: 2022-10-24

## 2021-12-27 NOTE — PROGRESS NOTES
"Chief Complaint  Follow-up (6 month) and Sinus Problem (off and on x 1 month)    Subjective          Val Waldron presents to [facility] [place]  History of Present Illness    Val is here for follow up of multiple chronic medical issues including hyperlipidemia, hypertension, GERD, and COPD among other issues.   She has history of ductal carcinoma of the right breast successfully treated with lumpectomy.  Dr. Lion follows and recommends annual mammogram.  Patient is a former smoker, but has been successful with continued tobacco cessation efforts, although she continues to use electronic cigarettes daily.  Dr. Hartley is following her chronic allergic rhinitis and mild COPD/mild intermittent asthma.  She is not currently requiring an inhaler, but has an albuterol rescue inhaler to use if needed.      Her blood pressure is well controlled.  Weight is up 2 pounds in the past 6 months and BMI is higher at 31.9.    Her lipid and other lab results are reasonable.  Results are reviewed with the patient and summarized below.    She is describing chronic/recurrent sinus pain and pressure, mostly involving frontal sinus and sometimes maxillary sinuses.  No purulent nasal discharge, but increased facial pain recently.  She is taking Singulair each night, but not using Astelin spray or nasal steroids currently.  No decongestants lately.    Objective   Vital Signs:   /82   Pulse 76   Temp 97.9 °F (36.6 °C) (Tympanic)   Ht 160 cm (63\")   Wt 81.6 kg (179 lb 12.8 oz)   BMI 31.85 kg/m²     Physical Exam  Vitals reviewed.   Constitutional:       General: She is not in acute distress.     Appearance: She is well-developed.      Comments: Pleasant female, obese.    HENT:      Head: Normocephalic and atraumatic.      Nose: Congestion present.      Right Sinus: No maxillary sinus tenderness or frontal sinus tenderness.      Left Sinus: No maxillary sinus tenderness or " frontal sinus tenderness.      Mouth/Throat:      Mouth: No oral lesions.      Pharynx: Uvula midline.      Tonsils: No tonsillar exudate.   Eyes:      Conjunctiva/sclera: Conjunctivae normal.      Pupils: Pupils are equal, round, and reactive to light.   Neck:      Thyroid: No thyroid mass or thyromegaly.      Vascular: No carotid bruit or JVD.      Trachea: Trachea normal. No tracheal deviation.   Cardiovascular:      Rate and Rhythm: Normal rate and regular rhythm.  No extrasystoles are present.     Chest Wall: PMI is not displaced.      Heart sounds: Normal heart sounds. No murmur heard.      Pulmonary:      Effort: Pulmonary effort is normal. No accessory muscle usage or respiratory distress.      Breath sounds: Normal breath sounds. No decreased breath sounds, wheezing, rhonchi or rales.      Comments: Chronic lung sounds.   Abdominal:      General: Bowel sounds are normal. There is no distension.      Palpations: Abdomen is soft.      Tenderness: There is no abdominal tenderness.   Musculoskeletal:      Cervical back: Neck supple.      Comments:      Lymphadenopathy:      Cervical: No cervical adenopathy.   Skin:     General: Skin is warm and dry.      Findings: No rash.      Nails: There is no clubbing.   Neurological:      General: No focal deficit present.      Mental Status: She is alert and oriented to person, place, and time.      Cranial Nerves: No cranial nerve deficit.      Coordination: Coordination normal.   Psychiatric:         Mood and Affect: Mood normal.         Speech: Speech normal.         Behavior: Behavior normal.         Thought Content: Thought content normal.         Judgment: Judgment normal.        Result Review :     Common labs    Common Labsle 6/9/21 6/9/21 6/9/21 12/20/21 12/20/21 12/20/21    1109 1109 1109 1142 1142 1142   Glucose  108 (A)   100 (A)    BUN  14   11    Creatinine  0.71   0.63    eGFR Non African Am  83   95    Sodium  138   137    Potassium  4.5   4.6    Chloride   102   103    Calcium  9.4   9.7    Albumin  4.20   4.10    Total Bilirubin  0.3   0.2    Alkaline Phosphatase  71   68    AST (SGOT)  33   28    ALT (SGPT)  26   23    WBC 6.83   7.33     Hemoglobin 13.6   13.1     Hematocrit 40.7   40.0     Platelets 394   349     Total Cholesterol   183   188   Triglycerides   113   128   HDL Cholesterol   62 (A)   68 (A)   LDL Cholesterol    101 (A)   98   (A) Abnormal value            Data reviewed: Consultant notes Dr. Lion          Assessment and Plan    Diagnoses and all orders for this visit:    1. Mixed hyperlipidemia (Primary)  -     CBC Auto Differential; Future  -     Comprehensive Metabolic Panel; Future  -     Lipid Panel; Future  -     TSH; Future    2. Essential (primary) hypertension  -     Comprehensive Metabolic Panel; Future    3. Pulmonary emphysema, unspecified emphysema type (HCC)  -     CBC Auto Differential; Future    4. Osteopenia of left hip  -     Vitamin D 25 Hydroxy; Future    5. Vitamin D deficiency  -     Vitamin D 25 Hydroxy; Future    6. Vitamin B12 deficiency  -     Vitamin B12; Future    7. Chronic frontal sinusitis    8. IFG (impaired fasting glucose)  -     Hemoglobin A1c; Future    Other orders  -     fluticasone (FLONASE) 50 MCG/ACT nasal spray; 1 spray into the nostril(s) as directed by provider 2 (Two) Times a Day. Administer 1 spray in each nostril twice daily.  Dispense: 15.8 mL; Refill: 6    Continue pravastatin and dietary efforts.  Cholesterol ratio is at goal.    Continue valsartan and sodium restriction.  Pursue more aggressive weight loss efforts.  Blood pressure is reasonable today.    Continue to use steroid and albuterol inhalers as needed for chronic lung disease.  She sees Dr. Hartley episodically as needed.  Continue to avoid all tobacco cigarettes.  We discussed the need to reduce the nicotine content in her electronic cigarettes and ultimately stop the use entirely.    Continue the calcium and vitamin D and vitamin B12  supplements.  Continue DEXA every 2 years.    Add Flonase nasal spray and saline nasal spray and use Cathy bottle for the next few days to address the current worsening of rather chronic sinusitis issues.    Include hemoglobin A1c with next year's labs in this patient demonstrating mild IFG.    Continue annual mammograms and follow with Dr. Lion as he recommends due to history of right DCIS addressed with lumpectomy successfully.    Return to clinic in 12 months for annual checkup with fasting labs prior.  The patient requested 12-month intervals instead of 6-month intervals for checkups.  Return sooner if needed.    I spent 42 minutes caring for Val on this date of service. This time includes time spent by me in the following activities:preparing for the visit, reviewing tests, obtaining and/or reviewing a separately obtained history, performing a medically appropriate examination and/or evaluation , counseling and educating the patient/family/caregiver, ordering medications, tests, or procedures and documenting information in the medical record  Follow Up   Return in 1 year (on 12/27/2022) for Next scheduled follow up - labs 1 week prior.  Patient was given instructions and counseling regarding her condition or for health maintenance advice. Please see specific information pulled into the AVS if appropriate.

## 2021-12-27 NOTE — PATIENT INSTRUCTIONS
Calorie Counting for Weight Loss  Calories are units of energy. Your body needs a certain number of calories from food to keep going throughout the day. When you eat or drink more calories than your body needs, your body stores the extra calories mostly as fat. When you eat or drink fewer calories than your body needs, your body burns fat to get the energy it needs.  Calorie counting means keeping track of how many calories you eat and drink each day. Calorie counting can be helpful if you need to lose weight. If you eat fewer calories than your body needs, you should lose weight. Ask your health care provider what a healthy weight is for you.  For calorie counting to work, you will need to eat the right number of calories each day to lose a healthy amount of weight per week. A dietitian can help you figure out how many calories you need in a day and will suggest ways to reach your calorie goal.  · A healthy amount of weight to lose each week is usually 1-2 lb (0.5-0.9 kg). This usually means that your daily calorie intake should be reduced by 500-750 calories.  · Eating 1,200-1,500 calories a day can help most women lose weight.  · Eating 1,500-1,800 calories a day can help most men lose weight.  What do I need to know about calorie counting?  Work with your health care provider or dietitian to determine how many calories you should get each day. To meet your daily calorie goal, you will need to:  · Find out how many calories are in each food that you would like to eat. Try to do this before you eat.  · Decide how much of the food you plan to eat.  · Keep a food log. Do this by writing down what you ate and how many calories it had.  To successfully lose weight, it is important to balance calorie counting with a healthy lifestyle that includes regular activity.  Where do I find calorie information?    The number of calories in a food can be found on a Nutrition Facts label. If a food does not have a Nutrition Facts  label, try to look up the calories online or ask your dietitian for help.  Remember that calories are listed per serving. If you choose to have more than one serving of a food, you will have to multiply the calories per serving by the number of servings you plan to eat. For example, the label on a package of bread might say that a serving size is 1 slice and that there are 90 calories in a serving. If you eat 1 slice, you will have eaten 90 calories. If you eat 2 slices, you will have eaten 180 calories.  How do I keep a food log?  After each time that you eat, record the following in your food log as soon as possible:  · What you ate. Be sure to include toppings, sauces, and other extras on the food.  · How much you ate. This can be measured in cups, ounces, or number of items.  · How many calories were in each food and drink.  · The total number of calories in the food you ate.  Keep your food log near you, such as in a pocket-sized notebook or on an allison or website on your mobile phone. Some programs will calculate calories for you and show you how many calories you have left to meet your daily goal.  What are some portion-control tips?  · Know how many calories are in a serving. This will help you know how many servings you can have of a certain food.  · Use a measuring cup to measure serving sizes. You could also try weighing out portions on a kitchen scale. With time, you will be able to estimate serving sizes for some foods.  · Take time to put servings of different foods on your favorite plates or in your favorite bowls and cups so you know what a serving looks like.  · Try not to eat straight from a food's packaging, such as from a bag or box. Eating straight from the package makes it hard to see how much you are eating and can lead to overeating. Put the amount you would like to eat in a cup or on a plate to make sure you are eating the right portion.  · Use smaller plates, glasses, and bowls for smaller  portions and to prevent overeating.  · Try not to multitask. For example, avoid watching TV or using your computer while eating. If it is time to eat, sit down at a table and enjoy your food. This will help you recognize when you are full. It will also help you be more mindful of what and how much you are eating.  What are tips for following this plan?  Reading food labels  · Check the calorie count compared with the serving size. The serving size may be smaller than what you are used to eating.  · Check the source of the calories. Try to choose foods that are high in protein, fiber, and vitamins, and low in saturated fat, trans fat, and sodium.  Shopping  · Read nutrition labels while you shop. This will help you make healthy decisions about which foods to buy.  · Pay attention to nutrition labels for low-fat or fat-free foods. These foods sometimes have the same number of calories or more calories than the full-fat versions. They also often have added sugar, starch, or salt to make up for flavor that was removed with the fat.  · Make a grocery list of lower-calorie foods and stick to it.  Cooking  · Try to cook your favorite foods in a healthier way. For example, try baking instead of frying.  · Use low-fat dairy products.  Meal planning  · Use more fruits and vegetables. One-half of your plate should be fruits and vegetables.  · Include lean proteins, such as chicken, turkey, and fish.  Lifestyle  Each week, aim to do one of the following:  · 150 minutes of moderate exercise, such as walking.  · 75 minutes of vigorous exercise, such as running.  General information  · Know how many calories are in the foods you eat most often. This will help you calculate calorie counts faster.  · Find a way of tracking calories that works for you. Get creative. Try different apps or programs if writing down calories does not work for you.  What foods should I eat?    · Eat nutritious foods. It is better to have a nutritious,  high-calorie food, such as an avocado, than a food with few nutrients, such as a bag of potato chips.  · Use your calories on foods and drinks that will fill you up and will not leave you hungry soon after eating.  ? Examples of foods that fill you up are nuts and nut butters, vegetables, lean proteins, and high-fiber foods such as whole grains. High-fiber foods are foods with more than 5 g of fiber per serving.  · Pay attention to calories in drinks. Low-calorie drinks include water and unsweetened drinks.  The items listed above may not be a complete list of foods and beverages you can eat. Contact a dietitian for more information.  What foods should I limit?  Limit foods or drinks that are not good sources of vitamins, minerals, or protein or that are high in unhealthy fats. These include:  · Candy.  · Other sweets.  · Sodas, specialty coffee drinks, alcohol, and juice.  The items listed above may not be a complete list of foods and beverages you should avoid. Contact a dietitian for more information.  How do I count calories when eating out?  · Pay attention to portions. Often, portions are much larger when eating out. Try these tips to keep portions smaller:  ? Consider sharing a meal instead of getting your own.  ? If you get your own meal, eat only half of it. Before you start eating, ask for a container and put half of your meal into it.  ? When available, consider ordering smaller portions from the menu instead of full portions.  · Pay attention to your food and drink choices. Knowing the way food is cooked and what is included with the meal can help you eat fewer calories.  ? If calories are listed on the menu, choose the lower-calorie options.  ? Choose dishes that include vegetables, fruits, whole grains, low-fat dairy products, and lean proteins.  ? Choose items that are boiled, broiled, grilled, or steamed. Avoid items that are buttered, battered, fried, or served with cream sauce. Items labeled as  crispy are usually fried, unless stated otherwise.  ? Choose water, low-fat milk, unsweetened iced tea, or other drinks without added sugar. If you want an alcoholic beverage, choose a lower-calorie option, such as a glass of wine or light beer.  ? Ask for dressings, sauces, and syrups on the side. These are usually high in calories, so you should limit the amount you eat.  ? If you want a salad, choose a garden salad and ask for grilled meats. Avoid extra toppings such as zapata, cheese, or fried items. Ask for the dressing on the side, or ask for olive oil and vinegar or lemon to use as dressing.  · Estimate how many servings of a food you are given. Knowing serving sizes will help you be aware of how much food you are eating at restaurants.  Where to find more information  · Centers for Disease Control and Prevention: www.cdc.gov  · [place] Department of Agriculture: myplate.gov  Summary  · Calorie counting means keeping track of how many calories you eat and drink each day. If you eat fewer calories than your body needs, you should lose weight.  · A healthy amount of weight to lose per week is usually 1-2 lb (0.5-0.9 kg). This usually means reducing your daily calorie intake by 500-750 calories.  · The number of calories in a food can be found on a Nutrition Facts label. If a food does not have a Nutrition Facts label, try to look up the calories online or ask your dietitian for help.  · Use smaller plates, glasses, and bowls for smaller portions and to prevent overeating.  · Use your calories on foods and drinks that will fill you up and not leave you hungry shortly after a meal.  This information is not intended to replace advice given to you by your health care provider. Make sure you discuss any questions you have with your health care provider.  Document Revised: 01/28/2021 Document Reviewed: 01/28/2021  Elsevier Patient Education © 2021 [employer]

## 2022-04-14 DIAGNOSIS — Z12.31 SCREENING MAMMOGRAM FOR HIGH-RISK PATIENT: Primary | ICD-10-CM

## 2022-04-19 RX ORDER — PRAVASTATIN SODIUM 40 MG
40 TABLET ORAL NIGHTLY
Qty: 30 TABLET | Refills: 8 | Status: SHIPPED | OUTPATIENT
Start: 2022-04-19 | End: 2022-12-22

## 2022-04-27 ENCOUNTER — OFFICE VISIT (OUTPATIENT)
Dept: SURGERY | Facility: CLINIC | Age: 62
End: 2022-04-27

## 2022-04-27 VITALS
DIASTOLIC BLOOD PRESSURE: 90 MMHG | BODY MASS INDEX: 31.01 KG/M2 | WEIGHT: 175 LBS | RESPIRATION RATE: 18 BRPM | OXYGEN SATURATION: 99 % | HEIGHT: 63 IN | HEART RATE: 69 BPM | TEMPERATURE: 97.7 F | SYSTOLIC BLOOD PRESSURE: 122 MMHG

## 2022-04-27 DIAGNOSIS — D05.11 DUCTAL CARCINOMA IN SITU (DCIS) OF RIGHT BREAST: Primary | Chronic | ICD-10-CM

## 2022-04-27 PROCEDURE — 99213 OFFICE O/P EST LOW 20 MIN: CPT | Performed by: SURGERY

## 2022-04-30 NOTE — PROGRESS NOTES
Chief Complaint   Patient presents with   • Follow-up     Jennie breast and mammogram        HPI  Hx of DCIS treated with lumpectomy and RT. Had calcifications LEFT side removed with stereo mammotome. Here for follow up imaging results and examination.  She has had no newly palpable masses, skin dimpling, nipple discharge, or axillary adenopathy.    Imaging:  Study Result    Narrative & Impression      PROCEDURE: Bilateral screening mammogram with 3-D tomosynthesis  with CAD     REASON FOR EXAM: Screening mammography     FINDINGS: Comparison study dated 3/2/2021, 9/22/2020, 3/6/2019,  8/23/2017, and 4/18/2017. No interval change in appearance of  breast parenchyma. Stable benign right breast postbiopsy changes.  No suspicious mass or malignant type microcalcifications . No  skin thickening or retraction. .     Parenchymal pattern: Scattered fibroglandular densities     IMPRESSION:  No mammographic evidence of malignancy.     BI-RADS category 2: Benign findings.     Recommendation: Annual mammography      Electronically signed by:  Kali Velasquez MD  4/19/2022 10:02 AM CDT  Workstation: [id number]     I have personally reviewed the imaging and concur with the radiologist's findings.    Past Medical History:   Diagnosis Date   • Allergic rhinitis    • Breast cancer (HCC)    • Chronic obstructive lung disease (HCC)     Relatively mild at this point   • Essential (primary) hypertension     Started Diovan, 3/2016      • GERD (gastroesophageal reflux disease)    • History of colon polyps    • Hx of radiation therapy    • Hyperlipidemia     On Pravachol    • Intraductal carcinoma of breast      In situ. Right. Lumpectomy 2013, Dr. Lion. Radiation, Dr. Velasco. On tamoxifen.   • Mild intermittent asthma    • Osteopenia     Last DEXA 7/2014. Started Caltrate, 7/2015    • Tobacco dependence syndrome        Past Surgical History:   Procedure Laterality Date   • BREAST BIOPSY  10/2020   • BREAST LUMPECTOMY     • BREAST SURGERY   10/08/2013    (2)  Excision of the previously excised medial margin.   9/16/2013 - Automated vacuum-assisted biopsy of the right breast. Sterotactic location guidance. Radiological examination of surgical specimen. Tissue marker placement   • COLONOSCOPY N/A 5/24/2021    Procedure: COLONOSCOPY;  Surgeon: Rey Darby DO;  Location: [facility] ENDOSCOPY;  Service: Gastroenterology;  Laterality: N/A;   • OTHER SURGICAL HISTORY      Biopsy of Cervix  -  abnormal Pap smear cervical biopsy was normal   • OTHER SURGICAL HISTORY  09/23/2013    Remove breast lesion (1). Ultrasound localization of the lesion followed by excision, clip placement and x-ray examination of the surgical specimen   • VAGINAL DELIVERY      x 2         Current Outpatient Medications:   •  albuterol sulfate  (90 Base) MCG/ACT inhaler, Inhale 2 puffs 4 (Four) Times a Day As Needed for Wheezing. (unconfirmed), Disp: , Rfl:   •  azelastine (ASTELIN) 0.1 % nasal spray, , Disp: , Rfl:   •  calcium carb-cholecalciferol 600-800 MG-UNIT tablet, Take 1 tablet by mouth 2 (Two) Times a Day With Meals., Disp: , Rfl:   •  fexofenadine (ALLEGRA) 180 MG tablet, Take 180 mg by mouth Daily As Needed., Disp: , Rfl:   •  fluticasone (FLONASE) 50 MCG/ACT nasal spray, 1 spray into the nostril(s) as directed by provider 2 (Two) Times a Day. Administer 1 spray in each nostril twice daily., Disp: 15.8 mL, Rfl: 6  •  lansoprazole (PREVACID) 30 MG capsule, Take 30 mg by mouth Every Morning. (unconfirmed), Disp: , Rfl:   •  montelukast (SINGULAIR) 10 MG tablet, Take 10 mg by mouth Daily., Disp: , Rfl:   •  pravastatin (PRAVACHOL) 40 MG tablet, TAKE 1 TABLET BY MOUTH EVERY NIGHT., Disp: 30 tablet, Rfl: 8  •  valsartan (DIOVAN) 80 MG tablet, Take 1 tablet by mouth Every Night. (Patient taking differently: Take 40 mg by mouth Every Night.), Disp: 30 tablet, Rfl: 5  •  acyclovir (ZOVIRAX) 800 MG tablet, , Disp: , Rfl:   •  Fluticasone Furoate (Arnuity Ellipta) 100 MCG/ACT  aerosol powder , Inhale 1 puff Daily., Disp: 30 each, Rfl: 5  •  pseudoephedrine-guaifenesin (MUCINEX D)  MG per 12 hr tablet, Take  by mouth. Take one tablet every morning and take one tablet with supper as needed for congestion/allergies.  (unconfirmed), Disp: , Rfl:   •  simethicone (MYLICON) 125 MG chewable tablet, Chew 125 mg As Needed., Disp: , Rfl:   •  tiZANidine (ZANAFLEX) 4 MG tablet, 1 po qhs prn muscle spasms, Disp: 30 tablet, Rfl: 0    Allergies   Allergen Reactions   • Ceftin [Cefuroxime] Shortness Of Breath   • Ciprofloxacin Shortness Of Breath   • Augmentin [Amoxicillin-Pot Clavulanate] GI Intolerance     N/V       Family History   Problem Relation Age of Onset   • Coronary artery disease Mother    • Prostate cancer Father    • Breast cancer Other         Aunt   • Breast cancer Paternal Aunt    • Endometrial cancer Neg Hx    • Ovarian cancer Neg Hx    • Colon cancer Neg Hx         Colorectal cancer       Social History     Socioeconomic History   • Marital status:    Tobacco Use   • Smoking status: Former Smoker     Types: Cigarettes   • Smokeless tobacco: Never Used   • Tobacco comment: Tobacco reviewed with patient 3/15/2016   Vaping Use   • Vaping Use: Some days   • Substances: Nicotine   Substance and Sexual Activity   • Alcohol use: Yes     Comment: drinks wine daily   • Drug use: No   • Sexual activity: Defer           Physical Exam  Chest:   Breasts: Breasts are symmetrical.      Right: No inverted nipple, mass, nipple discharge, skin change or tenderness.      Left: No inverted nipple, mass, nipple discharge, skin change or tenderness.           ASSESSMENT    Diagnoses and all orders for this visit:    1. Ductal carcinoma in situ (DCIS) of right breast (Primary)-history of lumpectomy 2013        PLAN    1.  1 year recheck with mammogram and examination              This document has been electronically signed by David Lion MD on April 30, 2022 14:34 CDT

## 2022-05-10 DIAGNOSIS — D05.11 DUCTAL CARCINOMA IN SITU (DCIS) OF RIGHT BREAST: ICD-10-CM

## 2022-05-10 DIAGNOSIS — Z12.31 SCREENING MAMMOGRAM FOR HIGH-RISK PATIENT: Primary | ICD-10-CM

## 2022-05-13 RX ORDER — VALSARTAN 80 MG/1
40 TABLET ORAL NIGHTLY
Qty: 15 TABLET | Refills: 11 | Status: SHIPPED | OUTPATIENT
Start: 2022-05-13 | End: 2023-01-09 | Stop reason: SDUPTHER

## 2022-06-27 RX ORDER — FLUTICASONE FUROATE 100 UG/1
1 POWDER RESPIRATORY (INHALATION) DAILY
Qty: 30 EACH | Refills: 5 | Status: SHIPPED | OUTPATIENT
Start: 2022-06-27 | End: 2023-01-09 | Stop reason: SDUPTHER

## 2022-10-20 ENCOUNTER — CLINICAL SUPPORT (OUTPATIENT)
Dept: FAMILY MEDICINE CLINIC | Facility: CLINIC | Age: 62
End: 2022-10-20

## 2022-10-20 DIAGNOSIS — Z23 NEED FOR INFLUENZA VACCINATION: Primary | ICD-10-CM

## 2022-10-20 PROCEDURE — 90686 IIV4 VACC NO PRSV 0.5 ML IM: CPT | Performed by: INTERNAL MEDICINE

## 2022-10-20 PROCEDURE — 90471 IMMUNIZATION ADMIN: CPT | Performed by: INTERNAL MEDICINE

## 2022-10-24 RX ORDER — FLUTICASONE PROPIONATE 50 MCG
SPRAY, SUSPENSION (ML) NASAL
Qty: 16 G | Refills: 6 | Status: SHIPPED | OUTPATIENT
Start: 2022-10-24 | End: 2023-02-28 | Stop reason: ALTCHOICE

## 2022-11-21 NOTE — PROGRESS NOTES
"Subjective        History of Present Illness     Val Waldron is a 57 y.o. female reporting a 3-4-day history of palpitations.  ECG performed in the office today reveals normal sinus rhythm with rate in 70s with no ectopy.  Although, during rooming, nurse was hearing a skipped beat approximately every 4 to 5 beats.  She had mentioned this last summer, but was otherwise asymptomatic at that time.  She reports she has noticed a feeling of lightheadedness associated with the episodes of palpitations, but denies syncopal episodes or chest pain.  She states she feels like the palpitations are more common postprandially.  I recommended wearing a Holter monitor and discussed documenting of events.  She reports taking a Mucinex D and using her inhaler this morning.  I recommended she avoid decongestants and inhalers, but she can use these while wearing the Holter monitor in order to document the events.  She is also taking Allegra and she is reassured this should not affect the palpitations.       She continues to use electronic cigarettes. We continue to recommend she stop smoking completely.     Review of Systems   Constitutional: Negative for chills, fatigue and fever.   HENT: Negative for congestion, ear pain, postnasal drip, sinus pressure and sore throat.    Respiratory: Negative for cough, shortness of breath and wheezing.    Cardiovascular: Positive for palpitations. Negative for chest pain and leg swelling.   Gastrointestinal: Negative for abdominal pain, blood in stool, constipation, diarrhea, nausea and vomiting.   Endocrine: Negative for cold intolerance, heat intolerance, polydipsia and polyuria.   Genitourinary: Negative for dysuria, frequency, hematuria and urgency.   Skin: Negative for rash.   Neurological: Negative for syncope and weakness.        Objective     Vitals:    03/27/17 1417   BP: 124/68   Pulse: 76   Temp: 98.7 °F (37.1 °C)   TempSrc: Oral   Weight: 163 lb 6.4 oz (74.1 kg)   Height: 63\" " Management discussed and patient voiced understanding. They were instructed to follow up with their PCP regarding any abnormal vitals reading. (160 cm)       ECG 12 Lead  Date/Time: 3/27/2017 3:37 PM  Performed by: JANINE AKHTAR  Authorized by: JANINE AKHTAR   Comparison: not compared with previous ECG   Previous ECG: no previous ECG available  Rhythm: sinus rhythm  Rate: normal  ST Segments: ST segments normal  T Waves: T waves normal  QRS axis: normal  Clinical impression: normal ECG              Physical Exam   Constitutional: She is oriented to person, place, and time. She appears well-developed and well-nourished. No distress.   HENT:   Head: Normocephalic and atraumatic.   Nose: Right sinus exhibits no maxillary sinus tenderness and no frontal sinus tenderness. Left sinus exhibits no maxillary sinus tenderness and no frontal sinus tenderness.   Eyes: EOM are normal. Pupils are equal, round, and reactive to light.   Neck: Trachea normal. Neck supple. No JVD present. Carotid bruit is not present. No tracheal deviation present. No thyroid mass and no thyromegaly present.   Cardiovascular: Normal rate, regular rhythm and normal heart sounds.   No extrasystoles are present. PMI is not displaced.    No murmur heard.  Clearly normal sinus rhythm, but occasional ectopic or skipped beats are noted.   Pulmonary/Chest: Effort normal. No accessory muscle usage. No respiratory distress. She has no decreased breath sounds. She has no wheezes. She has no rhonchi. She has no rales.   Chronic lung sounds   Abdominal: Soft. Bowel sounds are normal. She exhibits no distension. There is no hepatosplenomegaly. There is no tenderness.       Vascular Status -  Her exam exhibits right foot vasculature normal. Her exam exhibits no right foot edema. Her exam exhibits left foot vasculature normal. Her exam exhibits no left foot edema.  Lymphadenopathy:     She has no cervical adenopathy.   Neurological: She is alert and oriented to person, place, and time. No cranial nerve deficit. Coordination normal.   Skin: Skin is warm, dry and intact. No rash noted. No cyanosis. Nails  show no clubbing.   Psychiatric: She has a normal mood and affect. Her speech is normal and behavior is normal. Thought content normal.   Vitals reviewed.       Future Appointments  Date Time Provider Department Center   4/5/2017 3:00 PM [facility] WOMEN CTR MAMM 1 [facility]   4/5/2017 3:30 PM MAD DIAG US 1 [facility]   7/24/2017 10:00 AM Jesus López MD [facility] POW None   8/23/2017 3:30 PM [facility] WOMEN CTR MAMM 1 [facility]       Assessment/Plan      ECG today in the office today reveals normal sinus rhythm with no ectopy.   Schedule a 48-hour Holter monitor.   She should avoid the decongestants and inhalers if possible until she is wearing the Holter monitor.  We discussed documenting events in a diary.  We will refer to cardiology pending results of the Holter monitor.     She may continue to use the albuterol inhaler as needed, but we explained that it is likely increasing the frequency of palpitations.  She is encouraged to limit her use of decongestants, such as Mucinex D.      She continues to use electronic cigarettes and we continue to advise her to try to stop smoking completely.    Scribed for Dr. López by Diamond Duran [facility].     Diagnoses and all orders for this visit:    Palpitations  -     Holter monitor - 48 hour; Future    Pulmonary emphysema, unspecified emphysema type      Lab on 03/17/2017   Component Date Value Ref Range Status   • WBC 03/17/2017 9.47  3.20 - 9.80 10*3/mm3 Final   • RBC 03/17/2017 4.47  3.77 - 5.16 10*6/mm3 Final   • Hemoglobin 03/17/2017 13.4  12.0 - 15.5 g/dL Final   • Hematocrit 03/17/2017 39.1  35.0 - 45.0 % Final   • MCV 03/17/2017 87.5  80.0 - 98.0 fL Final   • MCH 03/17/2017 30.0  26.5 - 34.0 pg Final   • MCHC 03/17/2017 34.3  31.4 - 36.0 g/dL Final   • RDW 03/17/2017 14.6* 11.5 - 14.5 % Final   • RDW-SD 03/17/2017 46.7* 36.4 - 46.3 fl Final   • MPV 03/17/2017 9.4  8.0 - 12.0 fL Final   • Platelets 03/17/2017 380  150 - 450  10*3/mm3 Final   • Neutrophil % 03/17/2017 51.2  37.0 - 80.0 % Final   • Lymphocyte % 03/17/2017 35.8  10.0 - 50.0 % Final   • Monocyte % 03/17/2017 10.7  0.0 - 12.0 % Final   • Eosinophil % 03/17/2017 1.7  0.0 - 7.0 % Final   • Basophil % 03/17/2017 0.5  0.0 - 2.0 % Final   • Immature Grans % 03/17/2017 0.1  0.0 - 0.5 % Final   • Neutrophils, Absolute 03/17/2017 4.85  2.00 - 8.60 10*3/mm3 Final   • Lymphocytes, Absolute 03/17/2017 3.39  0.60 - 4.20 10*3/mm3 Final   • Monocytes, Absolute 03/17/2017 1.01* 0.00 - 0.90 10*3/mm3 Final   • Eosinophils, Absolute 03/17/2017 0.16  0.00 - 0.70 10*3/mm3 Final   • Basophils, Absolute 03/17/2017 0.05  0.00 - 0.20 10*3/mm3 Final   • Immature Grans, Absolute 03/17/2017 0.01  0.00 - 0.02 10*3/mm3 Final   ]     Calm

## 2022-12-22 ENCOUNTER — TELEPHONE (OUTPATIENT)
Dept: FAMILY MEDICINE CLINIC | Facility: CLINIC | Age: 62
End: 2022-12-22

## 2022-12-22 DIAGNOSIS — E78.2 MIXED HYPERLIPIDEMIA: Primary | Chronic | ICD-10-CM

## 2022-12-22 DIAGNOSIS — I10 ESSENTIAL (PRIMARY) HYPERTENSION: Chronic | ICD-10-CM

## 2022-12-22 DIAGNOSIS — R73.01 IFG (IMPAIRED FASTING GLUCOSE): Chronic | ICD-10-CM

## 2022-12-22 DIAGNOSIS — E55.9 VITAMIN D DEFICIENCY: Chronic | ICD-10-CM

## 2022-12-22 DIAGNOSIS — E53.8 VITAMIN B12 DEFICIENCY: Chronic | ICD-10-CM

## 2022-12-22 RX ORDER — PRAVASTATIN SODIUM 40 MG
40 TABLET ORAL NIGHTLY
Qty: 30 TABLET | Refills: 0 | Status: SHIPPED | OUTPATIENT
Start: 2022-12-22 | End: 2023-01-09 | Stop reason: SDUPTHER

## 2023-01-04 ENCOUNTER — LAB (OUTPATIENT)
Dept: LAB | Facility: OTHER | Age: 63
End: 2023-01-04
Payer: MEDICARE

## 2023-01-04 DIAGNOSIS — E78.2 MIXED HYPERLIPIDEMIA: Chronic | ICD-10-CM

## 2023-01-04 DIAGNOSIS — E53.8 VITAMIN B12 DEFICIENCY: Chronic | ICD-10-CM

## 2023-01-04 DIAGNOSIS — I10 ESSENTIAL (PRIMARY) HYPERTENSION: ICD-10-CM

## 2023-01-04 DIAGNOSIS — E55.9 VITAMIN D DEFICIENCY: Chronic | ICD-10-CM

## 2023-01-04 DIAGNOSIS — R73.01 IFG (IMPAIRED FASTING GLUCOSE): Chronic | ICD-10-CM

## 2023-01-04 LAB
ALBUMIN SERPL-MCNC: 4.1 G/DL (ref 3.5–5)
ALBUMIN/GLOB SERPL: 1.2 G/DL (ref 1.1–1.8)
ALP SERPL-CCNC: 62 U/L (ref 38–126)
ALT SERPL W P-5'-P-CCNC: 29 U/L
ANION GAP SERPL CALCULATED.3IONS-SCNC: 9 MMOL/L (ref 5–15)
AST SERPL-CCNC: 27 U/L (ref 14–36)
BASOPHILS # BLD AUTO: 0.07 10*3/MM3 (ref 0–0.2)
BASOPHILS NFR BLD AUTO: 1 % (ref 0–1.5)
BILIRUB SERPL-MCNC: 0.5 MG/DL (ref 0.2–1.3)
BUN SERPL-MCNC: 15 MG/DL (ref 7–23)
BUN/CREAT SERPL: 21.7 (ref 7–25)
CALCIUM SPEC-SCNC: 8.8 MG/DL (ref 8.4–10.2)
CHLORIDE SERPL-SCNC: 103 MMOL/L (ref 101–112)
CHOLEST SERPL-MCNC: 195 MG/DL (ref 150–200)
CO2 SERPL-SCNC: 26 MMOL/L (ref 22–30)
CREAT SERPL-MCNC: 0.69 MG/DL (ref 0.52–1.04)
DEPRECATED RDW RBC AUTO: 48.7 FL (ref 37–54)
EGFRCR SERPLBLD CKD-EPI 2021: 97.1 ML/MIN/1.73
EOSINOPHIL # BLD AUTO: 0.11 10*3/MM3 (ref 0–0.4)
EOSINOPHIL NFR BLD AUTO: 1.5 % (ref 0.3–6.2)
ERYTHROCYTE [DISTWIDTH] IN BLOOD BY AUTOMATED COUNT: 15 % (ref 12.3–15.4)
GLOBULIN UR ELPH-MCNC: 3.4 GM/DL (ref 2.3–3.5)
GLUCOSE SERPL-MCNC: 97 MG/DL (ref 70–99)
HCT VFR BLD AUTO: 39.1 % (ref 34–46.6)
HDLC SERPL-MCNC: 65 MG/DL (ref 40–59)
HGB BLD-MCNC: 13 G/DL (ref 12–15.9)
LDLC SERPL CALC-MCNC: 112 MG/DL
LDLC/HDLC SERPL: 1.69 {RATIO} (ref 0–3.22)
LYMPHOCYTES # BLD AUTO: 1.87 10*3/MM3 (ref 0.7–3.1)
LYMPHOCYTES NFR BLD AUTO: 25.4 % (ref 19.6–45.3)
MCH RBC QN AUTO: 30 PG (ref 26.6–33)
MCHC RBC AUTO-ENTMCNC: 33.2 G/DL (ref 31.5–35.7)
MCV RBC AUTO: 90.1 FL (ref 79–97)
MONOCYTES # BLD AUTO: 0.66 10*3/MM3 (ref 0.1–0.9)
MONOCYTES NFR BLD AUTO: 9 % (ref 5–12)
NEUTROPHILS NFR BLD AUTO: 4.64 10*3/MM3 (ref 1.7–7)
NEUTROPHILS NFR BLD AUTO: 63.1 % (ref 42.7–76)
PLATELET # BLD AUTO: 354 10*3/MM3 (ref 140–450)
PMV BLD AUTO: 9.2 FL (ref 6–12)
POTASSIUM SERPL-SCNC: 4 MMOL/L (ref 3.4–5)
PROT SERPL-MCNC: 7.5 G/DL (ref 6.3–8.6)
RBC # BLD AUTO: 4.34 10*6/MM3 (ref 3.77–5.28)
SODIUM SERPL-SCNC: 138 MMOL/L (ref 137–145)
TRIGL SERPL-MCNC: 102 MG/DL
VLDLC SERPL-MCNC: 18 MG/DL (ref 5–40)
WBC NRBC COR # BLD: 7.35 10*3/MM3 (ref 3.4–10.8)

## 2023-01-04 PROCEDURE — 82607 VITAMIN B-12: CPT | Performed by: INTERNAL MEDICINE

## 2023-01-04 PROCEDURE — 83036 HEMOGLOBIN GLYCOSYLATED A1C: CPT | Performed by: INTERNAL MEDICINE

## 2023-01-04 PROCEDURE — 80053 COMPREHEN METABOLIC PANEL: CPT | Performed by: INTERNAL MEDICINE

## 2023-01-04 PROCEDURE — 85025 COMPLETE CBC W/AUTO DIFF WBC: CPT | Performed by: INTERNAL MEDICINE

## 2023-01-04 PROCEDURE — 84443 ASSAY THYROID STIM HORMONE: CPT | Performed by: INTERNAL MEDICINE

## 2023-01-04 PROCEDURE — 82306 VITAMIN D 25 HYDROXY: CPT | Performed by: INTERNAL MEDICINE

## 2023-01-04 PROCEDURE — 36415 COLL VENOUS BLD VENIPUNCTURE: CPT | Performed by: INTERNAL MEDICINE

## 2023-01-04 PROCEDURE — 80061 LIPID PANEL: CPT | Performed by: INTERNAL MEDICINE

## 2023-01-05 LAB
25(OH)D3 SERPL-MCNC: 33.3 NG/ML (ref 30–100)
HBA1C MFR BLD: 5.5 % (ref 4.8–5.6)
TSH SERPL DL<=0.05 MIU/L-ACNC: 1.42 UIU/ML (ref 0.27–4.2)
VIT B12 BLD-MCNC: 405 PG/ML (ref 211–946)

## 2023-01-09 ENCOUNTER — OFFICE VISIT (OUTPATIENT)
Dept: FAMILY MEDICINE CLINIC | Facility: CLINIC | Age: 63
End: 2023-01-09
Payer: MEDICARE

## 2023-01-09 VITALS
WEIGHT: 171 LBS | TEMPERATURE: 98.2 F | HEART RATE: 76 BPM | SYSTOLIC BLOOD PRESSURE: 126 MMHG | DIASTOLIC BLOOD PRESSURE: 86 MMHG | BODY MASS INDEX: 30.3 KG/M2 | HEIGHT: 63 IN

## 2023-01-09 DIAGNOSIS — E78.2 MIXED HYPERLIPIDEMIA: Primary | Chronic | ICD-10-CM

## 2023-01-09 DIAGNOSIS — K21.9 GASTROESOPHAGEAL REFLUX DISEASE, UNSPECIFIED WHETHER ESOPHAGITIS PRESENT: Chronic | ICD-10-CM

## 2023-01-09 DIAGNOSIS — M85.852 OSTEOPENIA OF LEFT HIP: Chronic | ICD-10-CM

## 2023-01-09 DIAGNOSIS — E55.9 VITAMIN D DEFICIENCY: Chronic | ICD-10-CM

## 2023-01-09 DIAGNOSIS — E53.8 VITAMIN B12 DEFICIENCY: Chronic | ICD-10-CM

## 2023-01-09 DIAGNOSIS — J43.9 PULMONARY EMPHYSEMA, UNSPECIFIED EMPHYSEMA TYPE: Chronic | ICD-10-CM

## 2023-01-09 DIAGNOSIS — I10 ESSENTIAL (PRIMARY) HYPERTENSION: Chronic | ICD-10-CM

## 2023-01-09 PROCEDURE — 99214 OFFICE O/P EST MOD 30 MIN: CPT | Performed by: INTERNAL MEDICINE

## 2023-01-09 RX ORDER — MONTELUKAST SODIUM 10 MG/1
10 TABLET ORAL DAILY
Qty: 90 TABLET | Refills: 3 | Status: SHIPPED | OUTPATIENT
Start: 2023-01-09

## 2023-01-09 RX ORDER — ALBUTEROL SULFATE 90 UG/1
2 AEROSOL, METERED RESPIRATORY (INHALATION) 4 TIMES DAILY PRN
Qty: 18 G | Refills: 3 | Status: SHIPPED | OUTPATIENT
Start: 2023-01-09

## 2023-01-09 RX ORDER — FLUTICASONE FUROATE 100 UG/1
1 POWDER RESPIRATORY (INHALATION) DAILY
Qty: 90 EACH | Refills: 3 | Status: SHIPPED | OUTPATIENT
Start: 2023-01-09 | End: 2023-01-11 | Stop reason: CLARIF

## 2023-01-09 RX ORDER — PRAVASTATIN SODIUM 40 MG
40 TABLET ORAL NIGHTLY
Qty: 90 TABLET | Refills: 3 | Status: SHIPPED | OUTPATIENT
Start: 2023-01-09

## 2023-01-09 RX ORDER — LANSOPRAZOLE 30 MG/1
30 CAPSULE, DELAYED RELEASE ORAL EVERY MORNING
Qty: 90 CAPSULE | Refills: 3 | Status: SHIPPED | OUTPATIENT
Start: 2023-01-09

## 2023-01-09 RX ORDER — VALSARTAN 80 MG/1
40 TABLET ORAL NIGHTLY
Qty: 45 TABLET | Refills: 3 | Status: SHIPPED | OUTPATIENT
Start: 2023-01-09

## 2023-01-09 NOTE — PATIENT INSTRUCTIONS
Exercising to Lose Weight  Getting regular exercise is important for everyone. It is especially important if you are overweight. Being overweight increases your risk of heart disease, stroke, diabetes, high blood pressure, and several types of cancer. Exercising, and reducing the calories you consume, can help you lose weight and improve fitness and health.  Exercise can be moderate or vigorous intensity. To lose weight, most people need to do a certain amount of moderate or vigorous-intensity exercise each week.  How can exercise affect me?  You lose weight when you exercise enough to burn more calories than you eat. Exercise also reduces body fat and builds muscle. The more muscle you have, the more calories you burn. Exercise also:  Improves mood.  Reduces stress and tension.  Improves your overall fitness, flexibility, and endurance.  Increases bone strength.  Moderate-intensity exercise  Moderate-intensity exercise is any activity that gets you moving enough to burn at least three times more energy (calories) than if you were sitting.  Examples of moderate exercise include:  Walking a mile in 15 minutes.  Doing light yard work.  Biking at an easy pace.  Most people should get at least 150 minutes of moderate-intensity exercise a week to maintain their body weight.  Vigorous-intensity exercise  Vigorous-intensity exercise is any activity that gets you moving enough to burn at least six times more calories than if you were sitting. When you exercise at this intensity, you should be working hard enough that you are not able to carry on a conversation.  Examples of vigorous exercise include:  Running.  Playing a team sport, such as football, basketball, and soccer.  Jumping rope.  Most people should get at least 75 minutes a week of vigorous exercise to maintain their body weight.  What actions can I take to lose weight?  The amount of exercise you need to lose weight depends on:  Your age.  The type of  exercise.  Any health conditions you have.  Your overall physical ability.  Talk to your health care provider about how much exercise you need and what types of activities are safe for you.  Nutrition    Make changes to your diet as told by your health care provider or diet and nutrition specialist (dietitian). This may include:  Eating fewer calories.  Eating more protein.  Eating less unhealthy fats.  Eating a diet that includes fresh fruits and vegetables, whole grains, low-fat dairy products, and lean protein.  Avoiding foods with added fat, salt, and sugar.  Drink plenty of water while you exercise to prevent dehydration or heat stroke.  Activity  Choose an activity that you enjoy and set realistic goals. Your health care provider can help you make an exercise plan that works for you.  Exercise at a moderate or vigorous intensity most days of the week.  The intensity of exercise may vary from person to person. You can tell how intense a workout is for you by paying attention to your breathing and heartbeat. Most people will notice their breathing and heartbeat get faster with more intense exercise.  Do resistance training twice each week, such as:  Push-ups.  Sit-ups.  Lifting weights.  Using resistance bands.  Getting short amounts of exercise can be just as helpful as long, structured periods of exercise. If you have trouble finding time to exercise, try doing these things as part of your daily routine:  Get up, stretch, and walk around every 30 minutes throughout the day.  Go for a walk during your lunch break.  Park your car farther away from your destination.  If you take public transportation, get off one stop early and walk the rest of the way.  Make phone calls while standing up and walking around.  Take the stairs instead of elevators or escalators.  Wear comfortable clothes and shoes with good support.  Do not exercise so much that you hurt yourself, feel dizzy, or get very short of breath.  Where to  find more information  [place] Department of Health and Human Services: www.hhs.gov  Centers for Disease Control and Prevention: www.cdc.gov  Contact a health care provider:  Before starting a new exercise program.  If you have questions or concerns about your weight.  If you have a medical problem that keeps you from exercising.  Get help right away if:  You have any of the following while exercising:  Injury.  Dizziness.  Difficulty breathing or shortness of breath that does not go away when you stop exercising.  Chest pain.  Rapid heartbeat.  These symptoms may represent a serious problem that is an emergency. Do not wait to see if the symptoms will go away. Get medical help right away. Call your local emergency services (911 in the [place] Do not drive yourself to the hospital.  Summary  Getting regular exercise is especially important if you are overweight.  Being overweight increases your risk of heart disease, stroke, diabetes, high blood pressure, and several types of cancer.  Losing weight happens when you burn more calories than you eat.  Reducing the amount of calories you eat, and getting regular moderate or vigorous exercise each week, helps you lose weight.  This information is not intended to replace advice given to you by your health care provider. Make sure you discuss any questions you have with your health care provider.  Document Revised: 02/13/2022 Document Reviewed: 02/13/2022  Elsevier Patient Education © 2022 [employer]

## 2023-01-09 NOTE — PROGRESS NOTES
Chief Complaint  Annual Exam     Subjective        History of Present Illness     Val Waldron presents to the office for annual follow up on multiple chronic medical issues including hyperlipidemia, hypertension, GERD, and COPD among other issues.  She has history of ductal carcinoma of the right breast successfully treated with lumpectomy.  Dr. Lion follows and recommends annual mammogram.  She has retired and is now enrolled in Medicare.  Her  was recently diagnosed with lung cancer.  She feels she is adequately coping with the new diagnosis.      Weight is down 8 pounds in the past year with diet and exercise.  Labs continue to reveal IFG, not progressed to diabetes.      BP and HR at goal.      Patient is due PAP smear/GYN exam, last exam by Aruna Matthew in 2019.  She agrees to schedule.         Patient is due repeat DEXA.  DEXA 09/2020 revealed osteopenia left hip.    Lab results are reviewed with the patient today.  See results below.  Normal renal and liver function.  Normal thyroid screen.  Cholesterol at goal with pravastatin with excellent HDL of 65.  Vitamin D improved at 33.3.         Objective   Vital Signs:  /86   Pulse 76   Temp 98.2 °F (36.8 °C) (Tympanic)   Ht 160 cm (63\")   Wt 77.6 kg (171 lb)   BMI 30.29 kg/m²   Estimated body mass index is 30.29 kg/m² as calculated from the following:    Height as of this encounter: 160 cm (63\").    Weight as of this encounter: 77.6 kg (171 lb).          Physical Exam  Vitals reviewed.   Constitutional:       General: She is not in acute distress.     Appearance: She is well-developed.      Comments: Pleasant female.    HENT:      Head: Normocephalic and atraumatic.      Nose:      Right Sinus: No maxillary sinus tenderness or frontal sinus tenderness.      Left Sinus: No maxillary sinus tenderness or frontal sinus tenderness.      Mouth/Throat:      Mouth: No oral lesions.      Pharynx: Uvula midline.      Tonsils: No tonsillar  exudate.   Eyes:      Conjunctiva/sclera: Conjunctivae normal.      Pupils: Pupils are equal, round, and reactive to light.   Neck:      Thyroid: No thyroid mass or thyromegaly.      Vascular: No carotid bruit or JVD.      Trachea: Trachea normal. No tracheal deviation.   Cardiovascular:      Rate and Rhythm: Normal rate and regular rhythm.  No extrasystoles are present.     Chest Wall: PMI is not displaced.      Heart sounds: Normal heart sounds. No murmur heard.  Pulmonary:      Effort: Pulmonary effort is normal. No accessory muscle usage or respiratory distress.      Breath sounds: Normal breath sounds. No decreased breath sounds, wheezing, rhonchi or rales.   Abdominal:      General: Bowel sounds are normal. There is no distension.      Palpations: Abdomen is soft.      Tenderness: There is no abdominal tenderness.   Musculoskeletal:      Cervical back: Neck supple.   Lymphadenopathy:      Cervical: No cervical adenopathy.   Skin:     General: Skin is warm and dry.      Findings: No rash.      Nails: There is no clubbing.   Neurological:      Mental Status: She is alert and oriented to person, place, and time.      Cranial Nerves: No cranial nerve deficit.      Coordination: Coordination normal.   Psychiatric:         Speech: Speech normal.         Behavior: Behavior normal.         Thought Content: Thought content normal.         Judgment: Judgment normal.            Result Review :    CMP    CMP 1/4/23   Glucose 97   BUN 15   Creatinine 0.69   eGFR 97.1   Sodium 138   Potassium 4.0   Chloride 103   Calcium 8.8   Total Protein 7.5   Albumin 4.1   Globulin 3.4   Total Bilirubin 0.5   Alkaline Phosphatase 62   AST (SGOT) 27   ALT (SGPT) 29   Albumin/Globulin Ratio 1.2   BUN/Creatinine Ratio 21.7   Anion Gap 9.0      Comments are available for some flowsheets but are not being displayed.           CBC w/diff    CBC w/Diff 1/4/23   WBC 7.35   RBC 4.34   Hemoglobin 13.0   Hematocrit 39.1   MCV 90.1   MCH 30.0    MCHC 33.2   RDW 15.0   Platelets 354   Neutrophil Rel % 63.1   Lymphocyte Rel % 25.4   Monocyte Rel % 9.0   Eosinophil Rel % 1.5   Basophil Rel % 1.0           Lipid Panel    Lipid Panel 1/4/23   Total Cholesterol 195   Triglycerides 102   HDL Cholesterol 65 (A)   VLDL Cholesterol 18   LDL Cholesterol  112 (A)   LDL/HDL Ratio 1.69   (A) Abnormal value            TSH    TSH 1/4/23   TSH 1.420           A1C Last 3 Results    HGBA1C Last 3 Results 1/4/23   Hemoglobin A1C 5.50           Data reviewed: Consultant notes Dr. Lion          Assessment and Plan   Diagnoses and all orders for this visit:    1. Mixed hyperlipidemia (Primary)  -     CBC Auto Differential; Future  -     Comprehensive Metabolic Panel; Future  -     Lipid Panel; Future  -     TSH; Future    2. Essential (primary) hypertension  -     CBC Auto Differential; Future  -     Comprehensive Metabolic Panel; Future    3. Vitamin B12 deficiency  -     CBC Auto Differential; Future  -     Vitamin B12; Future    4. Vitamin D deficiency  -     Vitamin D,25-Hydroxy; Future    5. Osteopenia of left hip  -     DEXA Bone Density Axial; Future  -     Comprehensive Metabolic Panel; Future  -     Vitamin D,25-Hydroxy; Future    6. Pulmonary emphysema, unspecified emphysema type (HCC)  -     CBC Auto Differential; Future    7. Gastroesophageal reflux disease, unspecified whether esophagitis present    Other orders  -     albuterol sulfate  (90 Base) MCG/ACT inhaler; Inhale 2 puffs 4 (Four) Times a Day As Needed for Wheezing. (unconfirmed)  Dispense: 18 g; Refill: 3  -     montelukast (SINGULAIR) 10 MG tablet; Take 1 tablet by mouth Daily.  Dispense: 90 tablet; Refill: 3  -     lansoprazole (PREVACID) 30 MG capsule; Take 1 capsule by mouth Every Morning. (unconfirmed)  Dispense: 90 capsule; Refill: 3  -     valsartan (DIOVAN) 80 MG tablet; Take 0.5 tablets by mouth Every Night.  Dispense: 45 tablet; Refill: 3  -     pravastatin (PRAVACHOL) 40 MG tablet; Take 1  tablet by mouth Every Night.  Dispense: 90 tablet; Refill: 3  -     Fluticasone Furoate (Arnuity Ellipta) 100 MCG/ACT aerosol powder ; Inhale 1 puff Daily.  Dispense: 90 each; Refill: 3                Continue annual mammograms and follow with Dr. Lion as he recommends due to history of right DCIS addressed with lumpectomy successfully.  Recommended patient schedule overdue GYN exam/PAP smear.  She is in agreement.     Continue pravastatin and dietary efforts.  Cholesterol ratio is at goal with excellent HDL.    Reduce carbohydrates and calories to help achieve weight loss and delay progression of IFG.        Continue valsartan and sodium restriction.  Pursue more aggressive weight loss efforts.  Blood pressure and HR at goal.    Order is placed for patient to schedule DEXA to reassess osteopenia.  Continue the calcium and vitamin D.    Continue Prevacid for GERD symptoms which are well controlled.     Return in 12 months for routine annual exam with fasting labs one week prior or sooner if needed.      Scribed for Dr. López by Diamond Duran [facility].     Follow Up   Return in about 1 year (around 1/9/2024) for Next scheduled follow up - labs 1 week prior.  Patient was given instructions and counseling regarding her condition or for health maintenance advice. Please see specific information pulled into the AVS if appropriate.

## 2023-01-11 RX ORDER — FLUTICASONE PROPIONATE 110 UG/1
2 AEROSOL, METERED RESPIRATORY (INHALATION)
Qty: 36 G | Refills: 3 | Status: SHIPPED | OUTPATIENT
Start: 2023-01-11 | End: 2023-02-27

## 2023-02-27 ENCOUNTER — PRIOR AUTHORIZATION (OUTPATIENT)
Dept: FAMILY MEDICINE CLINIC | Facility: CLINIC | Age: 63
End: 2023-02-27
Payer: MEDICARE

## 2023-02-27 DIAGNOSIS — J44.1 CHRONIC OBSTRUCTIVE PULMONARY DISEASE WITH ACUTE EXACERBATION: Primary | Chronic | ICD-10-CM

## 2023-02-27 DIAGNOSIS — J44.1 CHRONIC OBSTRUCTIVE PULMONARY DISEASE WITH ACUTE EXACERBATION: Primary | ICD-10-CM

## 2023-02-27 DIAGNOSIS — J45.41 MODERATE PERSISTENT ASTHMA WITH ACUTE EXACERBATION: ICD-10-CM

## 2023-02-27 NOTE — TELEPHONE ENCOUNTER
PA for Arnuity Ellipta 100 mcg inhaler was submitted through [email or web address].     Note: Appears there is a quantity limit with this medication. I see previous refills through [facility] as 14 blisters per 30 days.    Dr López tried to send in generic Flovent inhaler and that was denied as well.     This patient suffers from COPD with acute exacerbation and irritant induced asthma. She has been using the Arnuity inhaler for several years and feels it works the best for her.     REF PA Key: [id number]

## 2023-02-28 RX ORDER — FLUTICASONE FUROATE 100 UG/1
1 POWDER RESPIRATORY (INHALATION) DAILY
Qty: 90 EACH | Refills: 3 | Status: SHIPPED | OUTPATIENT
Start: 2023-02-28

## 2023-02-28 NOTE — TELEPHONE ENCOUNTER
"PA [id number] was denied. I submitted this PA under the patient's [employer] card. She does not have medical benefits under them.    I resubmitted the PA under [employer].   BIN [id number]  N [id number]  GRP [id number]  ID [id number]    DX Used J44.9 COPD and J45.45 Asthma    Pef PA Jaimes [id number]. PA was approved. CaseId:[id number];Status:Approved;Review Type:Prior Auth;Coverage Start Date:01/29/2023;Coverage End Date:02/28/2024.    I am updating the medication chart to reflect the approved medication. Resending the medication with PA approval information on the script.             The drug you are requesting prior authorization for is not covered. Please select an alternative drug from the choices provided and send in a new prescription for that drug. You may also select \"Complete PA\" to obtain a PA for the originally requested drug.   FLOVENT HFA INH AEROSOL 44MCG   FLOVENT HFA INH AEROSOL 12GM 110MCG   FLOVENT HFA INH AEROSOL 12GM 220MCG   FLOVENT DISKUS (60DOSE) 50MCG   FLOVENT DISKUS (60DOSE) 100MCG   FLOVENT DISKUS (60DOSE) 250MCG   QVAR REDIHALER 10.6GM 40MCG   QVAR REDIHALER 10.6GM 80MCG    Has the patient tried at least two formulary alternatives from this list: The preferred alternatives include the following: ASMANEX,ASMANEX HFA,QVAR REDIHALER  "

## 2023-03-17 ENCOUNTER — TELEPHONE (OUTPATIENT)
Dept: FAMILY MEDICINE CLINIC | Facility: CLINIC | Age: 63
End: 2023-03-17
Payer: MEDICARE

## 2023-03-17 NOTE — TELEPHONE ENCOUNTER
----- Message from Milagro Beck MA sent at 3/17/2023  1:07 PM CDT -----    ----- Message -----  From: Jesus López MD  Sent: 3/16/2023   1:15 PM CDT  To: Milagro Beck MA    That is a shockingly high co-pay.  She should check with her insurance policy benefit manager to see if any other inhaled corticosteroid would be cheaper, and we will change it, but I am not aware of any less expensive option for her, unfortunately.  I believe the donut hole for medication expenses this year is around $1200.  After that, it should be fully covered.  EB  ----- Message -----  From: Milagro Beck MA  Sent: 3/16/2023  12:32 PM CDT  To: Jesus López MD    Patient contacted the office regarding her Arnuity inhaler. She stated that a PA was done on this a few weeks ago, but her cost is still $200.00. She mentioned another alternative recommended may be fluticasone HFA but this will also be $300.00. She would like to know if there are any other possible options.

## 2023-04-26 DIAGNOSIS — J45.41 MODERATE PERSISTENT ASTHMA WITH ACUTE EXACERBATION: ICD-10-CM

## 2023-04-26 DIAGNOSIS — J44.1 CHRONIC OBSTRUCTIVE PULMONARY DISEASE WITH ACUTE EXACERBATION: ICD-10-CM

## 2023-04-26 RX ORDER — FLUTICASONE FUROATE 100 UG/1
1 POWDER RESPIRATORY (INHALATION) DAILY
Qty: 30 EACH | Refills: 5 | Status: SHIPPED | OUTPATIENT
Start: 2023-04-26

## 2023-05-02 ENCOUNTER — OFFICE VISIT (OUTPATIENT)
Dept: SURGERY | Facility: CLINIC | Age: 63
End: 2023-05-02
Payer: MEDICARE

## 2023-05-02 VITALS
HEART RATE: 79 BPM | WEIGHT: 175.8 LBS | HEIGHT: 63 IN | DIASTOLIC BLOOD PRESSURE: 90 MMHG | TEMPERATURE: 97.2 F | BODY MASS INDEX: 31.15 KG/M2 | SYSTOLIC BLOOD PRESSURE: 156 MMHG

## 2023-05-02 DIAGNOSIS — D05.11 DUCTAL CARCINOMA IN SITU (DCIS) OF RIGHT BREAST: Primary | Chronic | ICD-10-CM

## 2023-05-02 PROCEDURE — 3077F SYST BP >= 140 MM HG: CPT | Performed by: SURGERY

## 2023-05-02 PROCEDURE — 1159F MED LIST DOCD IN RCRD: CPT | Performed by: SURGERY

## 2023-05-02 PROCEDURE — 3080F DIAST BP >= 90 MM HG: CPT | Performed by: SURGERY

## 2023-05-02 PROCEDURE — 1160F RVW MEDS BY RX/DR IN RCRD: CPT | Performed by: SURGERY

## 2023-05-03 NOTE — PROGRESS NOTES
Chief Complaint   Patient presents with   • Recheck breast   • Recheck mammogram        HPI  63-year-old woman here for routine breast check and mammogram.  She has had no newly palpable masses, skin dimpling, nipple discharge, or axillary adenopathy.  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  Most recent imaging demonstrates the following:  Study Result    Narrative & Impression   MAMMO SCREENING BILATERAL - DIGITAL W ANABELLA     CLINICAL HISTORY: Breast cancer screening.     ADDITIONAL HISTORY: None available     COMPARISON STUDIES: Comparison was made to prior exams.     TECHNIQUE: 2-D and 3-D digital mammography of bilateral breasts was obtained.  This exam was reviewed with the aid of CAD Lydia DMV 8.7.     BREAST DENSITY:  B. There are scattered areas of fibroglandular density.     FINDINGS:  No concerning mass, calcifications, or architectural distortion.     IMPRESSION AND RECOMMENDATION:  No mammographic evidence of malignancy. Recommendation is for the patient to  return for routine mammography in one year or sooner if clinically indicated.     ACR BI-RADS CATEGORY 1: Negative.     I have personally reviewed the imaging and concur with the radiologist's findings.      Past Medical History:   Diagnosis Date   • Allergic    • Allergic rhinitis    • Breast cancer    • Chronic obstructive lung disease     Relatively mild at this point   • Essential (primary) hypertension     Started Diovan, 3/2016      • GERD (gastroesophageal reflux disease)    • History of colon polyps    • Hx of radiation therapy    • Hyperlipidemia     On Pravachol    • Intraductal carcinoma of breast      In situ. Right. Lumpectomy 2013, Dr. Lion. Radiation, Dr. Velasco. On tamoxifen.   • Mild intermittent asthma    • Osteopenia     Last DEXA 7/2014. Started Caltrate, 7/2015    • Tobacco dependence syndrome        Past Surgical History:   Procedure Laterality Date    • BREAST BIOPSY  10/2020   • BREAST LUMPECTOMY     • BREAST SURGERY  10/08/2013    (2)  Excision of the previously excised medial margin.   9/16/2013 - Automated vacuum-assisted biopsy of the right breast. Sterotactic location guidance. Radiological examination of surgical specimen. Tissue marker placement   • COLONOSCOPY N/A 5/24/2021    Procedure: COLONOSCOPY;  Surgeon: Rey Darby DO;  Location: [facility] ENDOSCOPY;  Service: Gastroenterology;  Laterality: N/A;   • OTHER SURGICAL HISTORY      Biopsy of Cervix  -  abnormal Pap smear cervical biopsy was normal   • OTHER SURGICAL HISTORY  09/23/2013    Remove breast lesion (1). Ultrasound localization of the lesion followed by excision, clip placement and x-ray examination of the surgical specimen   • VAGINAL DELIVERY      x 2         Current Outpatient Medications:   •  albuterol sulfate  (90 Base) MCG/ACT inhaler, Inhale 2 puffs 4 (Four) Times a Day As Needed for Wheezing. (unconfirmed), Disp: 18 g, Rfl: 3  •  Arnuity Ellipta 100 MCG/ACT aerosol powder , INHALE 1 PUFF DAILY., Disp: 30 each, Rfl: 5  •  azelastine (ASTELIN) 0.1 % nasal spray, , Disp: , Rfl:   •  calcium carb-cholecalciferol 600-800 MG-UNIT tablet, Take 1 tablet by mouth 2 (Two) Times a Day With Meals., Disp: , Rfl:   •  fexofenadine (ALLEGRA) 180 MG tablet, Take 180 mg by mouth Daily As Needed., Disp: , Rfl:   •  lansoprazole (PREVACID) 30 MG capsule, Take 1 capsule by mouth Every Morning. (unconfirmed), Disp: 90 capsule, Rfl: 3  •  montelukast (SINGULAIR) 10 MG tablet, Take 1 tablet by mouth Daily., Disp: 90 tablet, Rfl: 3  •  pravastatin (PRAVACHOL) 40 MG tablet, Take 1 tablet by mouth Every Night., Disp: 90 tablet, Rfl: 3  •  simethicone (MYLICON) 125 MG chewable tablet, Chew 125 mg As Needed., Disp: , Rfl:   •  valsartan (DIOVAN) 80 MG tablet, Take 0.5 tablets by mouth Every Night., Disp: 45 tablet, Rfl: 3    Allergies   Allergen Reactions   • Ceftin [Cefuroxime] Shortness Of Breath    • Ciprofloxacin Shortness Of Breath   • Augmentin [Amoxicillin-Pot Clavulanate] GI Intolerance     N/V       Family History   Problem Relation Age of Onset   • Coronary artery disease Mother    • Arthritis Mother    • Prostate cancer Father    • Cancer Father    • Breast cancer Other         Aunt   • Breast cancer Paternal Aunt    • Endometrial cancer Neg Hx    • Ovarian cancer Neg Hx    • Colon cancer Neg Hx         Colorectal cancer       Social History     Socioeconomic History   • Marital status:    Tobacco Use   • Smoking status: Former     Types: Cigarettes   • Smokeless tobacco: Never   • Tobacco comments:     Tobacco reviewed with patient 3/15/2016   Vaping Use   • Vaping Use: Some days   • Substances: Nicotine   Substance and Sexual Activity   • Alcohol use: Yes     Alcohol/week: 5.0 standard drinks     Types: 5 Glasses of wine per week     Comment: drinks wine daily   • Drug use: Never   • Sexual activity: Defer       Physical Exam  Chest:   Breasts:     Breasts are symmetrical.      Right: No inverted nipple, mass, nipple discharge, skin change or tenderness.      Left: No inverted nipple, mass, nipple discharge, skin change or tenderness.           ASSESSMENT    Diagnoses and all orders for this visit:    1.  History of ductal carcinoma in situ (DCIS) of right breast (Primary)        PLAN    1.  Recheck 1 year with mammograms and examination              This document has been electronically signed by David Lion MD on May 3, 2023 18:10 CDT

## 2023-05-10 ENCOUNTER — OFFICE VISIT (OUTPATIENT)
Dept: SURGERY | Facility: CLINIC | Age: 63
End: 2023-05-10
Payer: MEDICARE

## 2023-05-10 VITALS
HEIGHT: 63 IN | SYSTOLIC BLOOD PRESSURE: 140 MMHG | BODY MASS INDEX: 31.01 KG/M2 | DIASTOLIC BLOOD PRESSURE: 70 MMHG | WEIGHT: 175 LBS | HEART RATE: 61 BPM | OXYGEN SATURATION: 100 % | TEMPERATURE: 97.3 F

## 2023-05-10 DIAGNOSIS — Z12.31 SCREENING MAMMOGRAM FOR HIGH-RISK PATIENT: Primary | ICD-10-CM

## 2023-05-10 DIAGNOSIS — R10.30 LOWER ABDOMINAL PAIN: Primary | ICD-10-CM

## 2023-05-10 PROCEDURE — 1160F RVW MEDS BY RX/DR IN RCRD: CPT | Performed by: SURGERY

## 2023-05-10 PROCEDURE — 3077F SYST BP >= 140 MM HG: CPT | Performed by: SURGERY

## 2023-05-10 PROCEDURE — 1159F MED LIST DOCD IN RCRD: CPT | Performed by: SURGERY

## 2023-05-10 PROCEDURE — 3078F DIAST BP <80 MM HG: CPT | Performed by: SURGERY

## 2023-05-12 NOTE — PROGRESS NOTES
Chief Complaint   Patient presents with   • Follow-up     1 WEEK FOLLOW UP         HPI  63-year-old woman seen last week for routine breast check.  At the time, she noted pain in her lower abdomen without fever or chills.  She has known diverticular disease from previous colonoscopy.  She is here to recheck on her abdominal pain which she states has improved significantly.  She is able to eat without difficulty.  Past Medical History:   Diagnosis Date   • Allergic    • Allergic rhinitis    • Breast cancer    • Chronic obstructive lung disease     Relatively mild at this point   • Essential (primary) hypertension     Started Diovan, 3/2016      • GERD (gastroesophageal reflux disease)    • History of colon polyps    • Hx of radiation therapy    • Hyperlipidemia     On Pravachol    • Intraductal carcinoma of breast      In situ. Right. Lumpectomy 2013, Dr. Lion. Radiation, Dr. Velasco. On tamoxifen.   • Mild intermittent asthma    • Osteopenia     Last DEXA 7/2014. Started Caltrate, 7/2015    • Tobacco dependence syndrome        Past Surgical History:   Procedure Laterality Date   • BREAST BIOPSY  10/2020   • BREAST LUMPECTOMY     • BREAST SURGERY  10/08/2013    (2)  Excision of the previously excised medial margin.   9/16/2013 - Automated vacuum-assisted biopsy of the right breast. Sterotactic location guidance. Radiological examination of surgical specimen. Tissue marker placement   • COLONOSCOPY N/A 5/24/2021    Procedure: COLONOSCOPY;  Surgeon: Rey Darby DO;  Location: [facility] ENDOSCOPY;  Service: Gastroenterology;  Laterality: N/A;   • OTHER SURGICAL HISTORY      Biopsy of Cervix  -  abnormal Pap smear cervical biopsy was normal   • OTHER SURGICAL HISTORY  09/23/2013    Remove breast lesion (1). Ultrasound localization of the lesion followed by excision, clip placement and x-ray examination of the surgical specimen   • VAGINAL DELIVERY      x 2         Current Outpatient Medications:   •  calcium  carb-cholecalciferol 600-800 MG-UNIT tablet, Take 1 tablet by mouth 2 (Two) Times a Day With Meals., Disp: , Rfl:   •  lansoprazole (PREVACID) 30 MG capsule, Take 1 capsule by mouth Every Morning. (unconfirmed), Disp: 90 capsule, Rfl: 3  •  montelukast (SINGULAIR) 10 MG tablet, Take 1 tablet by mouth Daily., Disp: 90 tablet, Rfl: 3  •  pravastatin (PRAVACHOL) 40 MG tablet, Take 1 tablet by mouth Every Night., Disp: 90 tablet, Rfl: 3  •  valsartan (DIOVAN) 80 MG tablet, Take 0.5 tablets by mouth Every Night., Disp: 45 tablet, Rfl: 3  •  vitamin D3 125 MCG (5000 UT) capsule capsule, Take 1 capsule by mouth Daily., Disp: , Rfl:   •  albuterol sulfate  (90 Base) MCG/ACT inhaler, Inhale 2 puffs 4 (Four) Times a Day As Needed for Wheezing. (unconfirmed) (Patient not taking: Reported on 5/10/2023), Disp: 18 g, Rfl: 3  •  Arnuity Ellipta 100 MCG/ACT aerosol powder , INHALE 1 PUFF DAILY. (Patient not taking: Reported on 5/10/2023), Disp: 30 each, Rfl: 5  •  azelastine (ASTELIN) 0.1 % nasal spray, , Disp: , Rfl:   •  fexofenadine (ALLEGRA) 180 MG tablet, Take 180 mg by mouth Daily As Needed. (Patient not taking: Reported on 5/10/2023), Disp: , Rfl:   •  simethicone (MYLICON) 125 MG chewable tablet, Chew 125 mg As Needed. (Patient not taking: Reported on 5/10/2023), Disp: , Rfl:     Allergies   Allergen Reactions   • Ceftin [Cefuroxime] Shortness Of Breath   • Ciprofloxacin Shortness Of Breath   • Augmentin [Amoxicillin-Pot Clavulanate] GI Intolerance     N/V       Family History   Problem Relation Age of Onset   • Coronary artery disease Mother    • Arthritis Mother    • Prostate cancer Father    • Cancer Father    • Breast cancer Other         Aunt   • Breast cancer Paternal Aunt    • Endometrial cancer Neg Hx    • Ovarian cancer Neg Hx    • Colon cancer Neg Hx         Colorectal cancer       Social History     Socioeconomic History   • Marital status:    Tobacco Use   • Smoking status: Former     Types:  Cigarettes   • Smokeless tobacco: Never   • Tobacco comments:     Tobacco reviewed with patient 3/15/2016   Vaping Use   • Vaping Use: Some days   • Substances: Nicotine   Substance and Sexual Activity   • Alcohol use: Yes     Alcohol/week: 5.0 standard drinks     Types: 5 Glasses of wine per week     Comment: drinks wine daily   • Drug use: Never   • Sexual activity: Defer       Review of Systems   Gastrointestinal: Negative for abdominal distention, abdominal pain, anal bleeding, blood in stool, constipation, diarrhea, nausea and vomiting.       Physical Exam  Abdominal:      General: Abdomen is flat. There is no distension.      Palpations: Abdomen is soft. There is no mass.      Tenderness: There is no abdominal tenderness. There is no guarding or rebound.      Hernia: No hernia is present.           ASSESSMENT    Diagnoses and all orders for this visit:    1. Lower abdominal pain (Primary)-now resolved        PLAN    1.  Recheck as needed  2.  Yearly breast check for breast cancer and mammogram              This document has been electronically signed by David Lion MD on May 11, 2023 22:02 CDT

## 2023-05-18 RX ORDER — FLUTICASONE PROPIONATE 220 UG/1
1 AEROSOL, METERED RESPIRATORY (INHALATION)
Qty: 12 G | Refills: 11 | Status: SHIPPED | OUTPATIENT
Start: 2023-05-18

## 2023-05-25 ENCOUNTER — PRIOR AUTHORIZATION (OUTPATIENT)
Dept: FAMILY MEDICINE CLINIC | Facility: CLINIC | Age: 63
End: 2023-05-25
Payer: MEDICARE

## 2023-05-25 NOTE — TELEPHONE ENCOUNTER
[id number]. DX used COPD J44.9    Preferred agents are Asmanex and Qvar.     Flovent is not a covered drug. I am returning all paperwork to Dr Goins with the preferred agents.

## 2023-06-01 ENCOUNTER — TELEPHONE (OUTPATIENT)
Dept: FAMILY MEDICINE CLINIC | Facility: CLINIC | Age: 63
End: 2023-06-01

## 2023-06-01 DIAGNOSIS — J45.41 MODERATE PERSISTENT ASTHMA WITH ACUTE EXACERBATION: Primary | ICD-10-CM

## 2023-06-01 NOTE — TELEPHONE ENCOUNTER
Beclomethasone Diprop HFA (QVAR Redihaler) 40 MCG/ACT inhaler [[id number]]    [facility] 90 Day supply     Was sent to [facility], 05/31/2023, pharmacy cancelled order

## 2023-08-08 ENCOUNTER — OFFICE VISIT (OUTPATIENT)
Dept: FAMILY MEDICINE CLINIC | Facility: CLINIC | Age: 63
End: 2023-08-08
Payer: MEDICARE

## 2023-08-08 VITALS
BODY MASS INDEX: 31.01 KG/M2 | WEIGHT: 175 LBS | HEART RATE: 86 BPM | TEMPERATURE: 97.4 F | DIASTOLIC BLOOD PRESSURE: 80 MMHG | OXYGEN SATURATION: 99 % | SYSTOLIC BLOOD PRESSURE: 126 MMHG | HEIGHT: 63 IN

## 2023-08-08 DIAGNOSIS — J32.4 CHRONIC PANSINUSITIS: Primary | ICD-10-CM

## 2023-08-08 DIAGNOSIS — J30.1 SEASONAL ALLERGIC RHINITIS DUE TO POLLEN: Chronic | ICD-10-CM

## 2023-08-08 DIAGNOSIS — I10 ESSENTIAL (PRIMARY) HYPERTENSION: Chronic | ICD-10-CM

## 2023-08-08 PROCEDURE — 3079F DIAST BP 80-89 MM HG: CPT | Performed by: INTERNAL MEDICINE

## 2023-08-08 PROCEDURE — 3074F SYST BP LT 130 MM HG: CPT | Performed by: INTERNAL MEDICINE

## 2023-08-08 PROCEDURE — 99214 OFFICE O/P EST MOD 30 MIN: CPT | Performed by: INTERNAL MEDICINE

## 2023-08-08 RX ORDER — TRIAMCINOLONE ACETONIDE 55 UG/1
2 SPRAY, METERED NASAL DAILY
COMMUNITY

## 2023-08-08 RX ORDER — PHENYLEPHRINE HCL 10 MG/1
TABLET, FILM COATED ORAL
Qty: 36 TABLET | Refills: 0 | Status: SHIPPED | OUTPATIENT
Start: 2023-08-08

## 2023-08-08 NOTE — PROGRESS NOTES
"Chief Complaint  Sinusitis (Went to Urgent care 07/26 for sinus and note states she had been sick x 6 weeks. States this started in June around Father day. A lot of drainage down throat and blood tinged mucus when she blows her nose. She has used Cathy pot. She has had 2 rounds of Doxy with last dose 08/04. She had a negative Covid home test 07/25. She has an appt with Dr. Hartley in 1 week) and Headache    Subjective        History of Present Illness    Val Waldron presents to the office with persistent sinus congestion and thick postnasal drainage with ear pressure at times.  She stated for follow up on visit to Urgent Care 07/26/2023 and diagnosed with maxillary sinusitis treated with Flonase and course of doxycycline.  Symptoms worsened around mid-June, at which times she took a course of 10-day course of doxycycline she had at home.  Symptoms improved, but did not resolve and she was seen at Urgent Care 07/26/2023 and diagnosed another course of doxycycline, which she completed 5 days ago.  She is not using the Flonase or Astelin nasal spray, but is using Nasacort currently.  She takes Singulair each evening and Allegra p.r.n. She has also used Mucinex.  She uses Netti pot twice daily, but reports burning yesterday when using the Netti pot.  She has an appointment with Dr. Hartley, allergist, next week.  She had negative home COVID test 07/25/2023.      We also discussed issues related to how she and her  are dealing with his diagnosis of lung cancer and decision not to undergo chemotherapy.  She is supportive of his decision.  He desires quality of life.  I am supportive of their decision.      Objective   Vital Signs:  /80   Pulse 86   Temp 97.4 øF (36.3 øC)   Ht 160 cm (63\")   Wt 79.4 kg (175 lb)   SpO2 99%   BMI 31.00 kg/mý   Estimated body mass index is 31 kg/mý as calculated from the following:    Height as of this encounter: 160 cm (63\").    Weight as of this encounter: 79.4 kg " (175 lb).           Physical Exam  Vitals reviewed.   Constitutional:       General: She is not in acute distress.     Appearance: She is well-developed.      Comments: Pleasant female.     HENT:      Head: Normocephalic and atraumatic.      Nose:      Right Sinus: No maxillary sinus tenderness or frontal sinus tenderness.      Left Sinus: No maxillary sinus tenderness or frontal sinus tenderness.      Comments: Sinus tenderness with palpation, frontal more than maxillary       Mouth/Throat:      Mouth: No oral lesions.      Pharynx: Uvula midline.      Tonsils: No tonsillar exudate.   Eyes:      Conjunctiva/sclera: Conjunctivae normal.      Pupils: Pupils are equal, round, and reactive to light.   Neck:      Thyroid: No thyroid mass or thyromegaly.      Vascular: No carotid bruit or JVD.      Trachea: Trachea normal. No tracheal deviation.   Cardiovascular:      Rate and Rhythm: Normal rate and regular rhythm. No extrasystoles are present.     Chest Wall: PMI is not displaced.      Heart sounds: Normal heart sounds. No murmur heard.  Pulmonary:      Effort: Pulmonary effort is normal. No accessory muscle usage or respiratory distress.      Breath sounds: Normal breath sounds. No decreased breath sounds, wheezing, rhonchi or rales.   Abdominal:      General: Bowel sounds are normal. There is no distension.      Palpations: Abdomen is soft.      Tenderness: There is no abdominal tenderness.   Musculoskeletal:      Cervical back: Neck supple.   Lymphadenopathy:      Cervical: No cervical adenopathy.   Skin:     General: Skin is warm and dry.      Findings: No rash.      Nails: There is no clubbing.   Neurological:      Mental Status: She is alert and oriented to person, place, and time.      Cranial Nerves: No cranial nerve deficit.      Coordination: Coordination normal.   Psychiatric:         Speech: Speech normal.         Behavior: Behavior normal.         Thought Content: Thought content normal.         Judgment:  Judgment normal.          Result Review :  The following data was reviewed by: Jesus López MD on 08/08/2023:  Common labs          1/4/2023    09:35   Common Labs   Glucose 97    BUN 15    Creatinine 0.69    Sodium 138    Potassium 4.0    Chloride 103    Calcium 8.8    Albumin 4.1    Total Bilirubin 0.5    Alkaline Phosphatase 62    AST (SGOT) 27    ALT (SGPT) 29    WBC 7.35    Hemoglobin 13.0    Hematocrit 39.1    Platelets 354    Total Cholesterol 195    Triglycerides 102    HDL Cholesterol 65    LDL Cholesterol  112    Hemoglobin A1C 5.50      Data reviewed : Radiologic studies sinus x-rays and [facility] 07/26/2023    Future Appointments   Date Time Provider Department Center   1/15/2024 10:30 AM Jesus López MD [facility]   4/22/2024  2:15 PM [facility] 1 [facility]   4/30/2024  1:30 PM David Lion MD [facility] None             Assessment and Plan   Diagnoses and all orders for this visit:    1. Chronic pansinusitis (Primary)  -     XR Sinuses 3+ View    2. Essential (primary) hypertension    3. Seasonal allergic rhinitis due to pollen    Other orders  -     phenylephrine (SUDAFED PE) 10 MG tablet; 1 po 2-3 times daily prn congestion  Dispense: 36 tablet; Refill: 0           I spent 31 minutes caring for Val on this date of service. This time includes time spent by me in the following activities:preparing for the visit, reviewing tests, obtaining and/or reviewing a separately obtained history, performing a medically appropriate examination and/or evaluation , counseling and educating the patient/family/caregiver, ordering medications, tests, or procedures, and documenting information in the medical record    Orders placed for patient to stop by for x-rays of sinuses.  These x-rays reveal no opacity or air-fluid levels.  We will notify with results.  We will hold off on antibiotics today since she is afebrile and denies discolored or malodorous drainage.   However, she can call us if she develops a fever or colored drainage and we can send a prescription for antibiotic . Keep appointment with Dr. Hartley next week.  Continue using saline nasal spray several times daily as well as Nasacort steroid nasal spray.  Coat the nostrils with vaseline.  Prescription sent for Sudafed PE 10 mg to take one in the morning with additional tablet lunch or supper time. Reduce to 1/2 strength on the Netti treatment to help prevent the burning.  Continue the Singulair and Allegra.  If secretions are thick, use Mucinex and reduce Allegra to QOD dosing.  Recommended patient start probiotic if she is prescribed another course of antibiotics.    Blood pressure is improved and well-controlled today.  Continue the valsartan.    We also discussed issues related to how she and her  are dealing with his diagnosis of lung cancer and decision not to undergo chemotherapy.  She is supportive of his decision.  He desires quality of life.  I am supportive of their decision.    Return 01/15/2023 for routine follow up with fasting labs one week prior or sooner if needed.     Scribed for Dr. López by Diamond Duran [facility].      Follow Up   Return if symptoms worsen or fail to improve, for Next scheduled follow up.  Patient was given instructions and counseling regarding her condition or for health maintenance advice. Please see specific information pulled into the AVS if appropriate.

## (undated) DEVICE — CANN SMPL SOFTECH BIFLO ETCO2 A/M 7FT

## (undated) DEVICE — SINGLE-USE BIOPSY FORCEPS: Brand: RADIAL JAW 4